# Patient Record
Sex: MALE | Race: WHITE | NOT HISPANIC OR LATINO | Employment: UNEMPLOYED | URBAN - METROPOLITAN AREA
[De-identification: names, ages, dates, MRNs, and addresses within clinical notes are randomized per-mention and may not be internally consistent; named-entity substitution may affect disease eponyms.]

---

## 2017-06-02 ENCOUNTER — TELEPHONE (OUTPATIENT)
Dept: SPORTS MEDICINE | Facility: CLINIC | Age: 62
End: 2017-06-02

## 2017-06-02 ENCOUNTER — TELEPHONE (OUTPATIENT)
Dept: FAMILY MEDICINE | Facility: CLINIC | Age: 62
End: 2017-06-02

## 2017-06-02 NOTE — TELEPHONE ENCOUNTER
Spoke c pt.     He asked for a handicap permit due to his R knee pain/meniscus tear.     Informed pt Dr. Gaxiola signs handicap parking permits on a very limited basis. Advised pt to see completion of form from PCP.     Offered appt c Dr. Gaxiola as pt has not been seen since 07/20/17. Pt deferred & will contact PCP.

## 2017-06-02 NOTE — TELEPHONE ENCOUNTER
----- Message from Christos Patel sent at 6/2/2017 12:21 PM CDT -----  Contact: self@home, [phone]  Pt called in asking for a call back regarding his knee injury. Please return call    Thank you

## 2017-06-05 NOTE — TELEPHONE ENCOUNTER
i have not seen him for any handicapping condition recently?. Last visit was in December 2016 for wellness exam. I see over a year ago he was seen for knee pain but would have to eval to see if this meets the criteria for handicapping condition that [facility] for.

## 2017-07-11 ENCOUNTER — TELEPHONE (OUTPATIENT)
Dept: FAMILY MEDICINE | Facility: CLINIC | Age: 62
End: 2017-07-11

## 2017-07-11 NOTE — TELEPHONE ENCOUNTER
----- Message from Samara Avelar sent at 7/11/2017  2:26 PM CDT -----  Contact: Self/ [phone]  Patient would like to speak with you about scheduling a sooner appointment than 9/15/17. Please advise.

## 2017-07-11 NOTE — TELEPHONE ENCOUNTER
Offered earlier appt with another Doctor in office.  Patient would prefer to wait to be seen by Dr Magallanes.

## 2017-08-22 DIAGNOSIS — Z12.11 COLON CANCER SCREENING: ICD-10-CM

## 2018-02-20 ENCOUNTER — TELEPHONE (OUTPATIENT)
Dept: FAMILY MEDICINE | Facility: CLINIC | Age: 63
End: 2018-02-20

## 2018-02-20 ENCOUNTER — OFFICE VISIT (OUTPATIENT)
Dept: FAMILY MEDICINE | Facility: CLINIC | Age: 63
End: 2018-02-20
Payer: COMMERCIAL

## 2018-02-20 VITALS
BODY MASS INDEX: 27 KG/M2 | TEMPERATURE: 99 F | HEART RATE: 82 BPM | SYSTOLIC BLOOD PRESSURE: 105 MMHG | DIASTOLIC BLOOD PRESSURE: 68 MMHG | WEIGHT: 199.31 LBS | OXYGEN SATURATION: 96 % | HEIGHT: 72 IN

## 2018-02-20 DIAGNOSIS — E78.5 HYPERLIPIDEMIA, UNSPECIFIED HYPERLIPIDEMIA TYPE: ICD-10-CM

## 2018-02-20 DIAGNOSIS — Z00.00 ROUTINE GENERAL MEDICAL EXAMINATION AT A HEALTH CARE FACILITY: Primary | ICD-10-CM

## 2018-02-20 DIAGNOSIS — Z11.59 ENCOUNTER FOR HEPATITIS C SCREENING TEST FOR LOW RISK PATIENT: ICD-10-CM

## 2018-02-20 PROCEDURE — 99999 PR PBB SHADOW E&M-EST. PATIENT-LVL III: CPT | Mod: PBBFAC,,, | Performed by: FAMILY MEDICINE

## 2018-02-20 PROCEDURE — 99396 PREV VISIT EST AGE 40-64: CPT | Mod: S$GLB,,, | Performed by: FAMILY MEDICINE

## 2018-02-20 RX ORDER — CLINDAMYCIN PHOSPHATE 11.9 MG/ML
SOLUTION TOPICAL 2 TIMES DAILY
Qty: 30 ML | Refills: 1 | Status: SHIPPED | OUTPATIENT
Start: 2018-02-20 | End: 2019-03-12

## 2018-02-20 RX ORDER — ATORVASTATIN CALCIUM 10 MG/1
10 TABLET, FILM COATED ORAL EVERY OTHER DAY
COMMUNITY
End: 2021-01-21 | Stop reason: SDUPTHER

## 2018-02-20 NOTE — TELEPHONE ENCOUNTER
----- Message from Alicia Wade sent at 2/20/2018  4:51 PM CST -----  Contact: [facility] [phone]  Pharmacy would like to speak with you about getting an authorization on a prescription clindamycin (CLEOCIN T) 1 % external solution. They only have quantity 60 available. Please call and advise.

## 2018-02-20 NOTE — PROGRESS NOTES
(Portions of this note were dictated using voice recognition software and may contain dictation related errors in spelling/grammar/syntax not found on text review)    CC:   Chief Complaint   Patient presents with    Annual Exam       HPI: 62 y.o. male last visit 2016    Hyperlipidemia: Was on Lipitor 20 mg daily (taking half a pill every other day.  Also takes garlic, fish oil, niacin OTC).      Elevated hemidiaphragm.  Was followed with pulmonology several years ago.  Was told that he needs implanted diaphragmatic pacemaker but this was not necessarily recommended.  Has not been doing these have a rehabilitation treatment.  Does get short of breath with walking distances especially with carrying objects.  No chest pain.  This is a chronic issue.  Was concerned about perhaps needing a handicap license to allow for closer parking secondary to this issue    Also had a right meniscal tear for which she was seen by orthopedics in 2016.  He gone to physical therapy temporarily but felt no improvement.  He is interested in perhaps seeing another orthopedist for opinion and for definitive management    He has had some folliculitis issues in the past and had an old prescription of clindamycin solution.  He would like to get a prescription of this renewed    Past Medical History:   Diagnosis Date    Elevated hemidiaphragm     HLD (hyperlipidemia)        Past Surgical History:   Procedure Laterality Date    NO PAST SURGERIES         Family History   Problem Relation Age of Onset    Skin cancer Father     Diabetes Neg Hx     Cancer Neg Hx     Coronary artery disease Neg Hx        Social History     Social History    Marital status: Single     Spouse name: N/A    Number of children: N/A    Years of education: N/A     Occupational History    Not on file.     Social History Main Topics    Smoking status: Never Smoker    Smokeless tobacco: Not on file    Alcohol use No    Drug use: Unknown    Sexual activity: Not  on file     Other Topics Concern    Not on file     Social History Narrative    No narrative on file                 HEALTH SCREENINGS  Immunizations:  tdap 2015  Flu: Declines     Age/Gender Appropriate screenings:  Colonoscopy is 7 years ago, normal.  Repeat in 10 years.  Prostate: PSA normal 11/16. Saw urologist, prostate exam nml.      Lab Results   Component Value Date    WBC 3.34 (L) 11/02/2016    HGB 15.5 11/02/2016    HCT 45.1 11/02/2016     11/02/2016    CHOL 225 (H) 11/02/2016    TRIG 116 11/02/2016    HDL 45 11/02/2016    ALT 27 11/02/2016    AST 23 11/02/2016     11/02/2016    K 4.2 11/02/2016     11/02/2016    CREATININE 1.2 11/02/2016    CALCIUM 9.0 11/02/2016    BUN 15 11/02/2016    CO2 23 11/02/2016    TSH 2.401 11/02/2016    PSA 1.9 11/02/2016    LDLCALC 156.8 11/02/2016     11/02/2016                 Vital signs reviewed  PE:   APPEARANCE: Well nourished, well developed, in no acute distress.    HEAD: Normocephalic, atraumatic.  EYES: PERRL. EOMI.   Conjunctivae noninjected.  EARS: TM's intact. Light reflex normal. No retraction or perforation.    NOSE: Mucosa pink. Airway clear.  MOUTH & THROAT: No tonsillar enlargement. No pharyngeal erythema or exudate.   NECK: Supple with no cervical lymphadenopathy.  No carotid bruits, no thyromegaly  CHEST: Good inspiratory effort. Lungs clear to auscultation with no wheezes or crackles.  CARDIOVASCULAR: Normal S1, S2. No rubs, murmurs, or gallops.  ABDOMEN: Bowel sounds normal. Not distended. Soft. No tenderness or masses. No organomegaly.  EXTREMITIES: No edema, cyanosis, or clubbing.  SKIN; folliculitis anterior chest    IMPRESSION  1. Routine general medical examination at a health care facility    2. Encounter for hepatitis C screening test for low risk patient    3. Hyperlipidemia, unspecified hyperlipidemia type        PLAN  Orders Placed This Encounter   Procedures    CBC auto differential    Comprehensive metabolic panel     Lipid panel    TSH    PSA, Screening    Hepatitis C antibody     Labs above including hepatitis C screening    Offered flu vaccine but patient declines    Continue Lipitor    Refill clindamycin solution    Given his respiratory issues, I have given him a temporary handicap sticker but have recommended referral to pulmonology again as he may need PFT repeated and also consideration for possible pulmonary rehabilitation if this might be an option to help with his chronic shortness of breath from elevated hemidiaphragm.  No imaging noted on file.  Did recommend that he get a chest x-ray to assess for this although he had mentioned that he is primarily here for his annual wellness exam and did not want to keep any other workup at this time.  He is about to change his insurance plan over the course of the next month and would like at that time to call back in to get the chest x-ray done along with a pulmonary referral    Right meniscal tear: Plans on seeing orthopedist for definitive management of this given fail prior therapy and progressive worsening of his knee symptoms on the right.  However, given some changes coming to his insurance coverage, he would like to wait until next month to call back to get the referral for orthopedic consultation    He has seen urology for recent prostate exam which was normal.  He requests a PSA to be done with his labs above which has been ordered        Answers for HPI/ROS submitted by the patient on 2/19/2018   activity change: No  unexpected weight change: No  neck pain: No  hearing loss: No  rhinorrhea: No  trouble swallowing: No  eye discharge: No  visual disturbance: No  chest tightness: No  wheezing: No  chest pain: No  palpitations: No  blood in stool: No  constipation: No  vomiting: No  diarrhea: No  polydipsia: No  polyuria: No  difficulty urinating: No  urgency: No  hematuria: No  joint swelling: No  arthralgias: No  headaches: No  weakness: No  confusion:  No  dysphoric mood: No

## 2018-02-20 NOTE — TELEPHONE ENCOUNTER
Pharmacy called.  They only have quantity 60 of clindamycin and is asking if it is ok to fill quatity 60?

## 2018-02-23 ENCOUNTER — TELEPHONE (OUTPATIENT)
Dept: FAMILY MEDICINE | Facility: CLINIC | Age: 63
End: 2018-02-23

## 2018-02-23 NOTE — TELEPHONE ENCOUNTER
----- Message from Erin Dia sent at 2/23/2018  1:04 PM CST -----  Contact: [phone]/self   Pt would like to speak with you concerning directions for his upcoming IFOBT test.  Pt states he will be going to take the test on Tuesday  Please call and advise

## 2018-02-23 NOTE — TELEPHONE ENCOUNTER
Advised patient that the Fitkit is a test he would take at home.  We are currently out in the office but did let patient know that I would contact him as soon as our order comes in so that he can come in to be educated on process.

## 2018-02-27 ENCOUNTER — LAB VISIT (OUTPATIENT)
Dept: LAB | Facility: HOSPITAL | Age: 63
End: 2018-02-27
Attending: FAMILY MEDICINE
Payer: COMMERCIAL

## 2018-02-27 DIAGNOSIS — Z00.00 ROUTINE GENERAL MEDICAL EXAMINATION AT A HEALTH CARE FACILITY: ICD-10-CM

## 2018-02-27 DIAGNOSIS — Z11.59 ENCOUNTER FOR HEPATITIS C SCREENING TEST FOR LOW RISK PATIENT: ICD-10-CM

## 2018-02-27 LAB
ALBUMIN SERPL BCP-MCNC: 3.7 G/DL
ALP SERPL-CCNC: 70 U/L
ALT SERPL W/O P-5'-P-CCNC: 18 U/L
ANION GAP SERPL CALC-SCNC: 7 MMOL/L
AST SERPL-CCNC: 19 U/L
BASOPHILS # BLD AUTO: 0.02 K/UL
BASOPHILS NFR BLD: 0.5 %
BILIRUB SERPL-MCNC: 0.4 MG/DL
BUN SERPL-MCNC: 18 MG/DL
CALCIUM SERPL-MCNC: 9.3 MG/DL
CHLORIDE SERPL-SCNC: 107 MMOL/L
CHOLEST SERPL-MCNC: 192 MG/DL
CHOLEST/HDLC SERPL: 4.1 {RATIO}
CO2 SERPL-SCNC: 26 MMOL/L
COMPLEXED PSA SERPL-MCNC: 2.7 NG/ML
CREAT SERPL-MCNC: 1.3 MG/DL
DIFFERENTIAL METHOD: NORMAL
EOSINOPHIL # BLD AUTO: 0.3 K/UL
EOSINOPHIL NFR BLD: 6 %
ERYTHROCYTE [DISTWIDTH] IN BLOOD BY AUTOMATED COUNT: 13.4 %
EST. GFR  (AFRICAN AMERICAN): >60 ML/MIN/1.73 M^2
EST. GFR  (NON AFRICAN AMERICAN): 58 ML/MIN/1.73 M^2
GLUCOSE SERPL-MCNC: 101 MG/DL
HCT VFR BLD AUTO: 43.9 %
HDLC SERPL-MCNC: 47 MG/DL
HDLC SERPL: 24.5 %
HGB BLD-MCNC: 14.7 G/DL
LDLC SERPL CALC-MCNC: 132.6 MG/DL
LYMPHOCYTES # BLD AUTO: 1.3 K/UL
LYMPHOCYTES NFR BLD: 32.1 %
MCH RBC QN AUTO: 30.2 PG
MCHC RBC AUTO-ENTMCNC: 33.5 G/DL
MCV RBC AUTO: 90 FL
MONOCYTES # BLD AUTO: 0.3 K/UL
MONOCYTES NFR BLD: 7 %
NEUTROPHILS # BLD AUTO: 2.3 K/UL
NEUTROPHILS NFR BLD: 54.4 %
NONHDLC SERPL-MCNC: 145 MG/DL
PLATELET # BLD AUTO: 180 K/UL
PMV BLD AUTO: 11 FL
POTASSIUM SERPL-SCNC: 4.2 MMOL/L
PROT SERPL-MCNC: 6.8 G/DL
RBC # BLD AUTO: 4.86 M/UL
SODIUM SERPL-SCNC: 140 MMOL/L
TRIGL SERPL-MCNC: 62 MG/DL
TSH SERPL DL<=0.005 MIU/L-ACNC: 1.97 UIU/ML
WBC # BLD AUTO: 4.14 K/UL

## 2018-02-27 PROCEDURE — 80053 COMPREHEN METABOLIC PANEL: CPT

## 2018-02-27 PROCEDURE — 86803 HEPATITIS C AB TEST: CPT

## 2018-02-27 PROCEDURE — 84153 ASSAY OF PSA TOTAL: CPT

## 2018-02-27 PROCEDURE — 36415 COLL VENOUS BLD VENIPUNCTURE: CPT

## 2018-02-27 PROCEDURE — 84443 ASSAY THYROID STIM HORMONE: CPT

## 2018-02-27 PROCEDURE — 85025 COMPLETE CBC W/AUTO DIFF WBC: CPT

## 2018-02-27 PROCEDURE — 80061 LIPID PANEL: CPT

## 2018-02-28 ENCOUNTER — TELEPHONE (OUTPATIENT)
Dept: FAMILY MEDICINE | Facility: CLINIC | Age: 63
End: 2018-02-28

## 2018-02-28 LAB — HCV AB SERPL QL IA: NEGATIVE

## 2018-03-04 ENCOUNTER — PATIENT MESSAGE (OUTPATIENT)
Dept: FAMILY MEDICINE | Facility: CLINIC | Age: 63
End: 2018-03-04

## 2018-03-05 ENCOUNTER — PATIENT MESSAGE (OUTPATIENT)
Dept: FAMILY MEDICINE | Facility: CLINIC | Age: 63
End: 2018-03-05

## 2018-03-06 ENCOUNTER — TELEPHONE (OUTPATIENT)
Dept: FAMILY MEDICINE | Facility: CLINIC | Age: 63
End: 2018-03-06

## 2018-03-12 ENCOUNTER — PATIENT MESSAGE (OUTPATIENT)
Dept: FAMILY MEDICINE | Facility: CLINIC | Age: 63
End: 2018-03-12

## 2018-03-12 ENCOUNTER — TELEPHONE (OUTPATIENT)
Dept: FAMILY MEDICINE | Facility: CLINIC | Age: 63
End: 2018-03-12

## 2018-03-12 DIAGNOSIS — R06.09 DOE (DYSPNEA ON EXERTION): Primary | ICD-10-CM

## 2018-03-12 DIAGNOSIS — J98.6 ELEVATED HEMIDIAPHRAGM: ICD-10-CM

## 2018-03-12 NOTE — TELEPHONE ENCOUNTER
Orders Placed This Encounter   Procedures    Ambulatory referral to Pulmonology     Encounter Diagnoses   Name Primary?    TREADWELL (dyspnea on exertion) Yes    Elevated hemidiaphragm

## 2018-03-13 ENCOUNTER — TELEPHONE (OUTPATIENT)
Dept: FAMILY MEDICINE | Facility: CLINIC | Age: 63
End: 2018-03-13

## 2018-03-13 NOTE — TELEPHONE ENCOUNTER
Explained to patient Dr. Magallanes's recommendations about his referral to Pulmonology. Patient verbalized understanding.

## 2018-03-13 NOTE — TELEPHONE ENCOUNTER
----- Message from Vandana Burnham sent at 3/13/2018 12:06 PM CDT -----  Patient wants to know why he's being referred to Pulmonology

## 2018-03-13 NOTE — TELEPHONE ENCOUNTER
Called patient in regards to email that was sent. Called to explain to patient that we haven't received the records back here. I advised him that it could have been sent to medical records for scanning and it takes a few weeks for processing. Patient was very upset. He stated I was condescending and did not give him a chance to speak (which I did) He stated he doesn't understand why he has to be included in the process of knowing when he dropped the papers off, who he dropped them to, and what time. I informed him that it was to better assist him in finding out what happened. He stated that he did not want to speak to me but to Dr. Magallanes directly.

## 2018-03-21 ENCOUNTER — CLINICAL SUPPORT (OUTPATIENT)
Dept: FAMILY MEDICINE | Facility: CLINIC | Age: 63
End: 2018-03-21
Payer: COMMERCIAL

## 2018-04-06 ENCOUNTER — LAB VISIT (OUTPATIENT)
Dept: LAB | Facility: HOSPITAL | Age: 63
End: 2018-04-06
Attending: FAMILY MEDICINE
Payer: COMMERCIAL

## 2018-04-06 DIAGNOSIS — Z12.11 COLON CANCER SCREENING: ICD-10-CM

## 2018-04-06 LAB — HEMOCCULT STL QL IA: NEGATIVE

## 2018-04-06 PROCEDURE — 82274 ASSAY TEST FOR BLOOD FECAL: CPT

## 2018-04-11 ENCOUNTER — OFFICE VISIT (OUTPATIENT)
Dept: PULMONOLOGY | Facility: CLINIC | Age: 63
End: 2018-04-11
Payer: COMMERCIAL

## 2018-04-11 ENCOUNTER — HOSPITAL ENCOUNTER (OUTPATIENT)
Dept: RADIOLOGY | Facility: HOSPITAL | Age: 63
Discharge: HOME | End: 2018-04-11
Attending: INTERNAL MEDICINE
Payer: COMMERCIAL

## 2018-04-11 VITALS
WEIGHT: 198 LBS | OXYGEN SATURATION: 97 % | HEART RATE: 73 BPM | HEIGHT: 72 IN | BODY MASS INDEX: 26.82 KG/M2 | SYSTOLIC BLOOD PRESSURE: 116 MMHG | DIASTOLIC BLOOD PRESSURE: 74 MMHG

## 2018-04-11 DIAGNOSIS — Q79.1 DIAPHRAGMATIC EVENTRATION: ICD-10-CM

## 2018-04-11 DIAGNOSIS — G47.33 OSA (OBSTRUCTIVE SLEEP APNEA): Primary | ICD-10-CM

## 2018-04-11 DIAGNOSIS — R09.81 NASAL CONGESTION: ICD-10-CM

## 2018-04-11 DIAGNOSIS — R06.09 DYSPNEA ON EXERTION: ICD-10-CM

## 2018-04-11 PROCEDURE — 71046 X-RAY EXAM CHEST 2 VIEWS: CPT | Mod: TC

## 2018-04-11 PROCEDURE — 99999 PR PBB SHADOW E&M-EST. PATIENT-LVL III: CPT | Mod: PBBFAC,,, | Performed by: INTERNAL MEDICINE

## 2018-04-11 PROCEDURE — 99244 OFF/OP CNSLTJ NEW/EST MOD 40: CPT | Mod: S$GLB,,, | Performed by: INTERNAL MEDICINE

## 2018-04-11 PROCEDURE — 71046 X-RAY EXAM CHEST 2 VIEWS: CPT | Mod: 26,,, | Performed by: RADIOLOGY

## 2018-04-11 NOTE — PROGRESS NOTES
Melo Zuñiga  was seen as a new patient at the request  Johnie Magallanes MD for the evaluation of  dyspnea.    CHIEF COMPLAINT:  Shortness of Breath      HISTORY OF PRESENT ILLNESS: Melo Zuñiga is a 62 y.o. male  has a past medical history of Elevated hemidiaphragm; HLD (hyperlipidemia); and Meniscal injury.  Patient was diagnosed with diaphgram paresis by Dr. Chu.  Patient endorsed difficulty with deep inspiration.  Patient denied orthopnea.  +difficulty with bending over tying shoelace.  No chest pain.  No fever/chills.  No pnd.  Exertion is limited by meniscal injury 3 years ago.  5 lbs weight gain over past 3 years.  No coughing or wheezing.  No hemoptysis.      PAST MEDICAL HISTORY:    Active Ambulatory Problems     Diagnosis Date Noted    HLD (hyperlipidemia)     Elevated hemidiaphragm     Dysphonia 07/19/2016    Vocal cord paralysis 07/19/2016     Resolved Ambulatory Problems     Diagnosis Date Noted    No Resolved Ambulatory Problems     Past Medical History:   Diagnosis Date    Elevated hemidiaphragm     HLD (hyperlipidemia)     Meniscal injury                 PAST SURGICAL HISTORY:    Past Surgical History:   Procedure Laterality Date    COLONOSCOPY      NO PAST SURGERIES           FAMILY HISTORY:                Family History   Problem Relation Age of Onset    Skin cancer Father     Diabetes Neg Hx     Cancer Neg Hx     Coronary artery disease Neg Hx        SOCIAL HISTORY:          Tobacco:   History   Smoking Status    Never Smoker   Smokeless Tobacco    Never Used     alcohol use:    History   Alcohol Use No               Occupation:  sales    ALLERGIES:  Review of patient's allergies indicates:  No Known Allergies    CURRENT MEDICATIONS:    Current Outpatient Prescriptions   Medication Sig Dispense Refill    ammonium lactate 12 % Crea apply to FEET daily as directed  0    atorvastatin (LIPITOR) 10 MG tablet Take 10 mg by mouth every other day.      clindamycin (CLEOCIN T) 1  % external solution Apply topically 2 (two) times daily. 30 mL 1     No current facility-administered medications for this visit.                   REVIEW OF SYSTEMS:     Pulmonary related symptoms as per HPI.  Gen:  no weight loss, no fever, no night sweat  HEENT:  no visual changes, no sore throat, no hearing loss  CV:  Per hpi  GI:  no melena, no hematochezia, no diarhea, no constipation.  :  no dysuria, no hematuria, no hesistancy, no dribbling  Neuro:  no syncope, no vertigo, no tinitus  Psych:  No homocide or suicide ideation; no depression.  Endocrine:  No heat or cold intolerance.  Sleep:  + snoring; no witnessed apnea.  Tire upon awake on most days.  No daytime sleepiness.  Otherwise, a balance of systems reviewed is negative.          PHYSICAL EXAM:  Vitals:    04/11/18 1513   BP: 116/74   Pulse: 73   SpO2: 97%   Weight: 89.8 kg (197 lb 15.6 oz)   Height: 6' (1.829 m)   PainSc: 0-No pain     Body mass index is 26.85 kg/m².     GENERAL:  well develop; no apparent distress  HEENT:  no nasal congestion; no discharge noted; class 4 modified mallampatti.   NECK:  supple; no palpable masses.  CARDIO: regular rate and rhythm  PULM:  clear to auscultation bilaterally; no intercostals retractions; no accessory muscle usage   ABDOMEN:  soft nontender/nondistended.  +bowel sound  EXTREMITIES no cce  NEURO:  CN II-XII intact.  5/5 motor in all extremities.  sensation grossly intact   to light touch.  PSYCH:  normal affect.  Alert and oriented x 4    LABS  Pulmonary Functions Testing Results: none  ABG none  CXR:  8/29/14 @[facility] (only dictation) elevation of left hemidiaphragm;  Sniff test 12/5/14 no paradoxical motion; paresis of left hemidiaphragm  CT CHEST:  none    ASSESSMENT    ICD-10-CM ICD-9-CM    1. JILLIAN (obstructive sleep apnea) G47.33 327.23 Polysomnogram (CPAP will be added if patient meets diagnostic criteria.)   2. 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 Dyspnea on exertion R06.09 786.09 Stress test, pulmonary   4. Nasal congestion R09.81 478.19        PLAN:    beata - snoring and restless sleep warrants sleep study.  Diaphragmatic dysfunction warrants in lab evaluation    Nasal congestion - sinus irrigation and nasal steroid.      diaphgram paresis - cxr, pft supine and erect, 6 min walk.  May consider sniff testing.      Dyspnea - with exertion.  deconditition +/- diaphragm dysfunction.  Will monitor.      Patient will Follow-up after sleep study. with md.    CC: Send copy of this note to Johnie Magallanes MD

## 2018-04-11 NOTE — LETTER
April 11, 2018      Johnie Magallanes MD  [address]  [address]  [facility] [address]           [facility]  [address]  [place] [address]  Phone: [phone]          Patient: Melo Zuñiga   MR Number: [id number]   YOB: 1955   Date of Visit: 4/11/2018       Dear Dr. Johnie Magallanes:    Thank you for referring Meol Zuñiga to me for evaluation. Attached you will find relevant portions of my assessment and plan of care.    If you have questions, please do not hesitate to call me. I look forward to following Melo Zuñiga along with you.    Sincerely,    Bernard Temple MD    Enclosure  CC:  No Recipients    If you would like to receive this communication electronically, please contact [email or web address] or [phone] to request more information on Shut Down Link access.    For providers and/or their staff who would like to refer a patient to [facility], please contact us through our one-stop-shop provider referral line, [facility], at [phone].    If you feel you have received this communication in error or would no longer like to receive these types of communications, please e-mail [email or web address]

## 2018-04-11 NOTE — PATIENT INSTRUCTIONS
1.  NeilMed Sinus Rinse Regular Kit    Recipe 1/4 teaspoon of salt and 1/4 teaspoon of baking soda in distilled water.  Be sure to tilt head forward as shown in picture.            flonase or nasonex over the counter.  2 sprays per nostril daily. Be sure to tilt head forward when dispensing medication.      Annamaria or Mono will contact you to schedule your sleep study. Their number is [phone] (ext 2). The [facility] is located on 7th floor of the [facility].    We will call you when the sleep study results are ready - if you have not heard from us by 2 weeks from the date of the study, please call [phone] (ext 1).    You are advised to abstain from driving should you feel sleepy or drowsy.

## 2018-04-12 ENCOUNTER — TELEPHONE (OUTPATIENT)
Dept: SLEEP MEDICINE | Facility: OTHER | Age: 63
End: 2018-04-12

## 2018-04-12 DIAGNOSIS — J98.6 DIAPHRAGM PARALYSIS: Primary | ICD-10-CM

## 2018-04-18 ENCOUNTER — HOSPITAL ENCOUNTER (OUTPATIENT)
Dept: RADIOLOGY | Facility: HOSPITAL | Age: 63
Discharge: HOME | End: 2018-04-18
Attending: INTERNAL MEDICINE
Payer: COMMERCIAL

## 2018-04-18 ENCOUNTER — HOSPITAL ENCOUNTER (OUTPATIENT)
Dept: PULMONOLOGY | Facility: CLINIC | Age: 63
Discharge: HOME | End: 2018-04-18
Payer: COMMERCIAL

## 2018-04-18 VITALS — HEIGHT: 69 IN | WEIGHT: 196.19 LBS | BODY MASS INDEX: 29.06 KG/M2

## 2018-04-18 DIAGNOSIS — J38.00 VOCAL CORD PARALYSIS: Primary | ICD-10-CM

## 2018-04-18 DIAGNOSIS — Q79.1 DIAPHRAGMATIC EVENTRATION: ICD-10-CM

## 2018-04-18 DIAGNOSIS — J98.6 DIAPHRAGM PARALYSIS: ICD-10-CM

## 2018-04-18 DIAGNOSIS — R06.09 DYSPNEA ON EXERTION: ICD-10-CM

## 2018-04-18 LAB
PRE FEV1 FVC: 74
PRE FEV1 FVC: 76
PRE FEV1: 2.35
PRE FEV1: 2.56
PRE FVC: 3.17
PRE FVC: 3.36
PREDICTED FEV1 FVC: 80
PREDICTED FEV1 FVC: 80
PREDICTED FEV1: 3.28
PREDICTED FEV1: 3.28
PREDICTED FVC: 4.09
PREDICTED FVC: 4.09

## 2018-04-18 PROCEDURE — 76000 FLUOROSCOPY <1 HR PHYS/QHP: CPT | Mod: TC

## 2018-04-18 PROCEDURE — 94727 GAS DIL/WSHOT DETER LNG VOL: CPT | Mod: S$GLB,,, | Performed by: INTERNAL MEDICINE

## 2018-04-18 PROCEDURE — 94010 BREATHING CAPACITY TEST: CPT | Mod: S$GLB,,, | Performed by: INTERNAL MEDICINE

## 2018-04-18 PROCEDURE — 94618 PULMONARY STRESS TESTING: CPT | Mod: S$GLB,,, | Performed by: INTERNAL MEDICINE

## 2018-04-18 PROCEDURE — 76000 FLUOROSCOPY <1 HR PHYS/QHP: CPT | Mod: 26,,, | Performed by: RADIOLOGY

## 2018-04-18 PROCEDURE — 94729 DIFFUSING CAPACITY: CPT | Mod: S$GLB,,, | Performed by: INTERNAL MEDICINE

## 2018-04-18 NOTE — PROCEDURES
Melo Zuñiga is a 62 y.o.  male patient, who presents for a 6 minute walk test ordered by Bernard Temple MD.  The diagnosis is Shortness of Breath.  The patient's BMI is 29 kg/m2.  Predicted distance (lower limit of normal) is 380.15 meters.      Test Results:    The test was completed without stopping.  The total time walked was 360 seconds.  During walking, the patient reported:  No complaints.  The patient used no assistive devices during testing.     04/18/2018---------Distance: 487.68 meters (1600 feet)     O2 Sat % Supplemental Oxygen Heart Rate Blood Pressure Linwood Scale   Pre-exercise  (Resting) 98 % Room Air 63 bpm 130/81 mmHg 3   During Exercise 98 % Room Air 77 bpm 124/70 mmHg 3   Post-exercise  (Recovery) 98 % Room Air  68 bpm       Recovery Time:  82 seconds    Performing nurse/tech:  JORDY Joyce      PREVIOUS STUDY:   The patient has not had a previous study.      CLINICAL INTERPRETATION:  Six minute walk distance is 487.68 meters (1600 feet) with moderate dyspnea.  During exercise, there was no desaturation while breathing room air.  Both blood pressure and heart rate remained stable with walking.  The patient did not report non-pulmonary symptoms during exercise.  No previous study performed.  Based upon age and body mass index, exercise capacity is normal.

## 2018-04-20 ENCOUNTER — TELEPHONE (OUTPATIENT)
Dept: SLEEP MEDICINE | Facility: CLINIC | Age: 63
End: 2018-04-20

## 2018-04-20 NOTE — TELEPHONE ENCOUNTER
Result of pft, sniff and 6 min walk d/w patient.  Aware of diagnosis of diaphragm paralysis.  In light of normal pft and 6 min walk, I do not recommend surgical intervention.  At this time, recommend weight loss with exercise after knee arthroscopy.  Please schedule 1 year follow recall.

## 2018-04-23 ENCOUNTER — TELEPHONE (OUTPATIENT)
Dept: SLEEP MEDICINE | Facility: CLINIC | Age: 63
End: 2018-04-23

## 2018-05-08 ENCOUNTER — TELEPHONE (OUTPATIENT)
Dept: SLEEP MEDICINE | Facility: OTHER | Age: 63
End: 2018-05-08

## 2018-05-09 ENCOUNTER — HOSPITAL ENCOUNTER (OUTPATIENT)
Dept: SLEEP MEDICINE | Facility: OTHER | Age: 63
Discharge: HOME | End: 2018-05-09
Attending: INTERNAL MEDICINE
Payer: COMMERCIAL

## 2018-05-09 DIAGNOSIS — G47.33 OSA (OBSTRUCTIVE SLEEP APNEA): ICD-10-CM

## 2018-05-09 PROCEDURE — 95811 POLYSOM 6/>YRS CPAP 4/> PARM: CPT | Mod: 26,,, | Performed by: INTERNAL MEDICINE

## 2018-05-09 PROCEDURE — 95811 POLYSOM 6/>YRS CPAP 4/> PARM: CPT

## 2018-05-10 NOTE — PROGRESS NOTES
This is a Screen Study for 62 year old Melo Zuñiga.   The procedure was explained to the patient upon arrival. This included the set - up process and what to expect during the night.   It was also explained to the patient that the test will be interpreted by a physician and the results will be sent to his PCP.  His EKg appeared to be NSR with Occational PVCs.  soft - moderate snoring observed.  Sleep disorder breathing most significant in REM sleep.  He met criteria in time for a split night study.   Started CPAP with a Med. Eson nasal mask and chinstrap @ 4cm  Occational small mouth leaks may benefit from a full face mask.   Explored pressures 4 - 11  Supine REM @ 10 cm   Pt reaction to CPAP: Did not like it   A thank you letter was given to the patient upon leaving the sleep lab.

## 2018-05-16 ENCOUNTER — TELEPHONE (OUTPATIENT)
Dept: PULMONOLOGY | Facility: HOSPITAL | Age: 63
End: 2018-05-16

## 2018-05-16 DIAGNOSIS — G47.33 OSA (OBSTRUCTIVE SLEEP APNEA): Primary | ICD-10-CM

## 2018-05-17 NOTE — TELEPHONE ENCOUNTER
Please let patient know that he/she has sleep apnea and the titration went well.  Patient can have the option of coming into clinic to further discuss result of sleep study  or begin cpap and follow up in clinic.  If patient agreed to cpap, then I would like to set patient up with cpap via dme of choice.  Clinic follow up with md in approximately 8 weeks after cpap usage.

## 2019-01-23 ENCOUNTER — PATIENT MESSAGE (OUTPATIENT)
Dept: DERMATOLOGY | Facility: CLINIC | Age: 64
End: 2019-01-23

## 2019-01-28 ENCOUNTER — PATIENT MESSAGE (OUTPATIENT)
Dept: DERMATOLOGY | Facility: CLINIC | Age: 64
End: 2019-01-28

## 2019-01-30 ENCOUNTER — OFFICE VISIT (OUTPATIENT)
Dept: DERMATOLOGY | Facility: CLINIC | Age: 64
End: 2019-01-30
Payer: MEDICARE

## 2019-01-30 DIAGNOSIS — L30.8 ASTEATOTIC ECZEMA: Primary | ICD-10-CM

## 2019-01-30 DIAGNOSIS — Q82.8 KERATODERMA: ICD-10-CM

## 2019-01-30 PROCEDURE — 99999 PR PBB SHADOW E&M-EST. PATIENT-LVL II: ICD-10-PCS | Mod: PBBFAC,,, | Performed by: DERMATOLOGY

## 2019-01-30 PROCEDURE — 99213 OFFICE O/P EST LOW 20 MIN: CPT | Mod: S$PBB,,, | Performed by: DERMATOLOGY

## 2019-01-30 PROCEDURE — 99999 PR PBB SHADOW E&M-EST. PATIENT-LVL II: CPT | Mod: PBBFAC,,, | Performed by: DERMATOLOGY

## 2019-01-30 PROCEDURE — 99212 OFFICE O/P EST SF 10 MIN: CPT | Mod: PBBFAC,PO | Performed by: DERMATOLOGY

## 2019-01-30 PROCEDURE — 99213 PR OFFICE/OUTPT VISIT, EST, LEVL III, 20-29 MIN: ICD-10-PCS | Mod: S$PBB,,, | Performed by: DERMATOLOGY

## 2019-01-30 RX ORDER — AMMONIUM LACTATE 12 G/100G
CREAM TOPICAL
Qty: 140 G | Refills: 6 | Status: SHIPPED | OUTPATIENT
Start: 2019-01-30 | End: 2019-07-02

## 2019-01-30 RX ORDER — TRIAMCINOLONE ACETONIDE 1 MG/G
CREAM TOPICAL
Qty: 454 G | Refills: 1 | Status: SHIPPED | OUTPATIENT
Start: 2019-01-30 | End: 2019-03-12

## 2019-01-30 NOTE — PROGRESS NOTES
Subjective:       Patient ID:  Melo Zuñiga is a 63 y.o. male who presents for   Chief Complaint   Patient presents with    Itching    Rash     hands     Pt presnets for new onset severe  Itching lower legs x 1 month. Constant.  tx with decreasing showers, antiseptic such as listerine, OTC lotion.  All helps temporarily.  Also has new rash type issue on dorsal hands x 1 week.       Itching     Rash         Review of Systems   Constitutional: Negative for fever, chills, weight loss, weight gain, fatigue and malaise.   Skin: Positive for itching, rash and dry skin.   Hematologic/Lymphatic: Negative for adenopathy. Does not bruise/bleed easily.        Objective:    Physical Exam   Constitutional: He appears well-developed and well-nourished. No distress.   Neurological: He is alert and oriented to person, place, and time. He is not disoriented.   Psychiatric: He has a normal mood and affect.   Skin:   Areas Examined (abnormalities noted in diagram):   RUE Inspected  LUE Inspection Performed  RLE Inspected  LLE Inspection Performed  Nails and Digits Inspection Performed                  Diagram Legend     Erythematous scaling macule/papule c/w actinic keratosis       Vascular papule c/w angioma      Pigmented verrucoid papule/plaque c/w seborrheic keratosis      Yellow umbilicated papule c/w sebaceous hyperplasia      Irregularly shaped tan macule c/w lentigo     1-2 mm smooth white papules consistent with Milia      Movable subcutaneous cyst with punctum c/w epidermal inclusion cyst      Subcutaneous movable cyst c/w pilar cyst      Firm pink to brown papule c/w dermatofibroma      Pedunculated fleshy papule(s) c/w skin tag(s)      Evenly pigmented macule c/w junctional nevus     Mildly variegated pigmented, slightly irregular-bordered macule c/w mildly atypical nevus      Flesh colored to evenly pigmented papule c/w intradermal nevus       Pink pearly papule/plaque c/w basal cell carcinoma      Erythematous  hyperkeratotic cursted plaque c/w SCC      Surgical scar with no sign of skin cancer recurrence      Open and closed comedones      Inflammatory papules and pustules      Verrucoid papule consistent consistent with wart     Erythematous eczematous patches and plaques     Dystrophic onycholytic nail with subungual debris c/w onychomycosis     Umbilicated papule    Erythematous-base heme-crusted tan verrucoid plaque consistent with inflamed seborrheic keratosis     Erythematous Silvery Scaling Plaque c/w Psoriasis     See annotation      Assessment / Plan:        Asteatotic eczema  Discussed with patient good skin care regimen including avoiding fragranced products and very hot showers.  Recommended dove sensitive skin bar soap or cerave hydrating cleanser or bar.  Recommend Cerave cream  For moisturization daily -2x daily.     -     triamcinolone acetonide 0.1% (KENALOG) 0.1 % cream; AAA bid to legs prn itching  Dispense: 454 g; Refill: 1    Keratoderma  -     ammonium lactate 12 % Crea; aaa  On bottom of feet as needed  Dispense: 140 g; Refill: 6             Follow-up if symptoms worsen or fail to improve.

## 2019-01-31 ENCOUNTER — TELEPHONE (OUTPATIENT)
Dept: FAMILY MEDICINE | Facility: CLINIC | Age: 64
End: 2019-01-31

## 2019-01-31 NOTE — TELEPHONE ENCOUNTER
----- Message from Mala Issa sent at 1/31/2019  4:10 PM CST -----  Contact: Self/ [phone]  Patient asked to speak with you about his handicap plaque.    Please call.

## 2019-01-31 NOTE — TELEPHONE ENCOUNTER
Patient advised that Dr Magallanes is off today and will be able to address request then.  Patient verbalized understanding.

## 2019-02-18 ENCOUNTER — PATIENT MESSAGE (OUTPATIENT)
Dept: FAMILY MEDICINE | Facility: CLINIC | Age: 64
End: 2019-02-18

## 2019-02-26 ENCOUNTER — PATIENT MESSAGE (OUTPATIENT)
Dept: DERMATOLOGY | Facility: CLINIC | Age: 64
End: 2019-02-26

## 2019-03-12 ENCOUNTER — OFFICE VISIT (OUTPATIENT)
Dept: PODIATRY | Facility: CLINIC | Age: 64
End: 2019-03-12
Payer: MEDICARE

## 2019-03-12 VITALS — BODY MASS INDEX: 29.99 KG/M2 | WEIGHT: 202.5 LBS | HEIGHT: 69 IN

## 2019-03-12 DIAGNOSIS — M20.41 HAMMER TOES OF BOTH FEET: ICD-10-CM

## 2019-03-12 DIAGNOSIS — M21.6X1 ACQUIRED EQUINUS DEFORMITY OF BOTH FEET: ICD-10-CM

## 2019-03-12 DIAGNOSIS — M21.6X2 ACQUIRED EQUINUS DEFORMITY OF BOTH FEET: ICD-10-CM

## 2019-03-12 DIAGNOSIS — M20.42 HAMMER TOES OF BOTH FEET: ICD-10-CM

## 2019-03-12 DIAGNOSIS — M21.41 ACQUIRED PES PLANOVALGUS OF RIGHT FOOT: ICD-10-CM

## 2019-03-12 DIAGNOSIS — M21.42 ACQUIRED PES PLANOVALGUS OF LEFT FOOT: Primary | ICD-10-CM

## 2019-03-12 PROCEDURE — 99999 PR PBB SHADOW E&M-EST. PATIENT-LVL III: CPT | Mod: PBBFAC,,, | Performed by: PODIATRIST

## 2019-03-12 PROCEDURE — 99203 PR OFFICE/OUTPT VISIT, NEW, LEVL III, 30-44 MIN: ICD-10-PCS | Mod: S$PBB,,, | Performed by: PODIATRIST

## 2019-03-12 PROCEDURE — 99213 OFFICE O/P EST LOW 20 MIN: CPT | Mod: PBBFAC,PN | Performed by: PODIATRIST

## 2019-03-12 PROCEDURE — 99999 PR PBB SHADOW E&M-EST. PATIENT-LVL III: ICD-10-PCS | Mod: PBBFAC,,, | Performed by: PODIATRIST

## 2019-03-12 PROCEDURE — 99203 OFFICE O/P NEW LOW 30 MIN: CPT | Mod: S$PBB,,, | Performed by: PODIATRIST

## 2019-03-24 NOTE — PROGRESS NOTES
Subjective:      Patient ID: Melo Zuñiga is a 63 y.o. male.    Chief Complaint: Foot Pain (keya -insteps &  right foot 2nd toe) and Other Misc (PCP:  Dr Magallanes 2/20/18)    Melo is a 63 y.o. male who presents to the podiatry clinic  with complaint of  bilateral foot pain. Onset of the symptoms was several years ago. Precipitating event: none known. Current symptoms include: ability to bear weight, but with some pain and worsening symptoms after a period of activity. Aggravating factors: any weight bearing. Symptoms have gradually worsened. Patient has had prior foot problems. Evaluation to date: none. Treatment to date: custom arch supports he got several years ago but are wearing out. THey helped at first. Patients rates pain 7/10 on pain scale.        Review of Systems   Constitution: Negative for chills and fever.   Cardiovascular: Negative for claudication and leg swelling.   Respiratory: Negative for shortness of breath.    Skin: Negative for itching, nail changes and rash.   Musculoskeletal: Positive for arthritis. Negative for muscle cramps, muscle weakness and myalgias.   Gastrointestinal: Negative for nausea and vomiting.   Neurological: Negative for focal weakness, loss of balance, numbness and paresthesias.           Objective:      Physical Exam   Constitutional: He is oriented to person, place, and time. He appears well-developed and well-nourished. No distress.   Cardiovascular:   Pulses:       Dorsalis pedis pulses are 2+ on the right side, and 2+ on the left side.        Posterior tibial pulses are 2+ on the right side, and 2+ on the left side.   < 3 sec capillary refill time to toes 1-5 bilateral. Toes and feet are warm to touch proximally with normal distal cooling b/l. There is some hair growth on the feet and toes b/l. There is no edema b/l. No spider veins or varicosities present b/l.      Musculoskeletal:   Bilateral medial arch collapse with weight bearing, eversion of 8 deg with  RCSP.    Reducible extensor and flexor contractures at the MTPJ and PIPJ of toes 2-5, bilat.     Equinus noted b/l ankles with < 10 deg DF noted. MMT 5/5 in DF/PF/Inv/Ev resistance with no reproduction of pain in any direction. Passive range of motion of ankle and pedal joints is painless b/l.     Neurological: He is alert and oriented to person, place, and time. He has normal strength. He displays no atrophy and no tremor. No sensory deficit. He exhibits normal muscle tone.   Negative tinel sign bilateral.   Skin: Skin is warm, dry and intact. No abrasion, no bruising, no burn, no ecchymosis, no laceration, no lesion, no petechiae and no rash noted. He is not diaphoretic. No cyanosis or erythema. No pallor. Nails show no clubbing.   Skin temperature, texture and turgor within normal limits.   Psychiatric: He has a normal mood and affect. His behavior is normal.             Assessment:       Encounter Diagnoses   Name Primary?    Acquired pes planovalgus of left foot Yes    Acquired pes planovalgus of right foot     Acquired equinus deformity of both feet     Hammer toes of both feet          Plan:       Melo was seen today for foot pain and other misc.    Diagnoses and all orders for this visit:    Acquired pes planovalgus of left foot  -     ORTHOTIC DEVICE (DME)    Acquired pes planovalgus of right foot  -     ORTHOTIC DEVICE (DME)    Acquired equinus deformity of both feet  -     ORTHOTIC DEVICE (DME)    Hammer toes of both feet  -     ORTHOTIC DEVICE (DME)      I counseled the patient on his conditions, their implications and medical management.    Will get new prescription orthotics, I gave a prescription and a list of places to get it filled    Patient will stretch the tendo achilles complex three times daily as demonstrated in the office.  Literature was dispensed illustrating proper stretching technique.    Discussed importance of wearing shoes with adequate room in toe box to accommodate his toe  deformities. Also advised to have a thumb width from end of big toe to end of toe box in his shoes    Return PHOEBE Leiva DPM

## 2019-05-05 ENCOUNTER — PATIENT MESSAGE (OUTPATIENT)
Dept: FAMILY MEDICINE | Facility: CLINIC | Age: 64
End: 2019-05-05

## 2019-05-06 ENCOUNTER — PATIENT MESSAGE (OUTPATIENT)
Dept: FAMILY MEDICINE | Facility: CLINIC | Age: 64
End: 2019-05-06

## 2019-05-06 RX ORDER — HYDROXYZINE HYDROCHLORIDE 25 MG/1
TABLET, FILM COATED ORAL
Qty: 60 TABLET | Refills: 3 | Status: SHIPPED | OUTPATIENT
Start: 2019-05-06 | End: 2019-07-02

## 2019-05-06 NOTE — TELEPHONE ENCOUNTER
Sure, I can refill this for you.  I have written for 2 a night although it you could even try taking 3 a night for couple nights if you  needed to  Johnie Magallanes MD    ===View-only below this line===      ----- Message -----     From: Melo Zuñiga     Sent: 5/5/2019  6:51 PM CDT       To: Johnie Magallanes MD  Subject: Prescription    Hydroxyzine HCL (generic for atarax) 25 MG tablets. I was taking 2 at night and that was kind of ok. I would like to have it refilled or something a little stronger, at any [facility] in [place]. thanx melo [phone]

## 2019-06-18 ENCOUNTER — PATIENT OUTREACH (OUTPATIENT)
Dept: ADMINISTRATIVE | Facility: HOSPITAL | Age: 64
End: 2019-06-18

## 2019-06-18 ENCOUNTER — DOCUMENTATION ONLY (OUTPATIENT)
Dept: ADMINISTRATIVE | Facility: HOSPITAL | Age: 64
End: 2019-06-18

## 2019-06-18 DIAGNOSIS — Z12.9 CANCER SCREENING: Primary | ICD-10-CM

## 2019-06-28 ENCOUNTER — PATIENT MESSAGE (OUTPATIENT)
Dept: DERMATOLOGY | Facility: CLINIC | Age: 64
End: 2019-06-28

## 2019-07-02 ENCOUNTER — OFFICE VISIT (OUTPATIENT)
Dept: FAMILY MEDICINE | Facility: CLINIC | Age: 64
End: 2019-07-02
Payer: MEDICARE

## 2019-07-02 VITALS
WEIGHT: 196.19 LBS | HEART RATE: 67 BPM | OXYGEN SATURATION: 98 % | BODY MASS INDEX: 26.57 KG/M2 | HEIGHT: 72 IN | TEMPERATURE: 98 F | DIASTOLIC BLOOD PRESSURE: 74 MMHG | SYSTOLIC BLOOD PRESSURE: 130 MMHG

## 2019-07-02 DIAGNOSIS — R07.9 CHEST PAIN, UNSPECIFIED TYPE: Primary | ICD-10-CM

## 2019-07-02 DIAGNOSIS — F33.1 MODERATE EPISODE OF RECURRENT MAJOR DEPRESSIVE DISORDER: ICD-10-CM

## 2019-07-02 DIAGNOSIS — G47.00 INSOMNIA, UNSPECIFIED TYPE: ICD-10-CM

## 2019-07-02 PROCEDURE — 93010 ELECTROCARDIOGRAM REPORT: CPT | Mod: S$PBB,,, | Performed by: INTERNAL MEDICINE

## 2019-07-02 PROCEDURE — 99999 PR PBB SHADOW E&M-EST. PATIENT-LVL III: CPT | Mod: PBBFAC,,, | Performed by: FAMILY MEDICINE

## 2019-07-02 PROCEDURE — 93010 EKG 12-LEAD: ICD-10-PCS | Mod: S$PBB,,, | Performed by: INTERNAL MEDICINE

## 2019-07-02 PROCEDURE — 99999 PR PBB SHADOW E&M-EST. PATIENT-LVL III: ICD-10-PCS | Mod: PBBFAC,,, | Performed by: FAMILY MEDICINE

## 2019-07-02 PROCEDURE — 99213 OFFICE O/P EST LOW 20 MIN: CPT | Mod: PBBFAC,25,PO | Performed by: FAMILY MEDICINE

## 2019-07-02 PROCEDURE — 93005 ELECTROCARDIOGRAM TRACING: CPT | Mod: PBBFAC,PO | Performed by: INTERNAL MEDICINE

## 2019-07-02 PROCEDURE — 99215 PR OFFICE/OUTPT VISIT, EST, LEVL V, 40-54 MIN: ICD-10-PCS | Mod: S$PBB,,, | Performed by: FAMILY MEDICINE

## 2019-07-02 PROCEDURE — 99215 OFFICE O/P EST HI 40 MIN: CPT | Mod: S$PBB,,, | Performed by: FAMILY MEDICINE

## 2019-07-02 RX ORDER — DULOXETIN HYDROCHLORIDE 30 MG/1
30 CAPSULE, DELAYED RELEASE ORAL DAILY
Qty: 30 CAPSULE | Refills: 11 | Status: SHIPPED | OUTPATIENT
Start: 2019-07-02 | End: 2020-08-31 | Stop reason: SDUPTHER

## 2019-07-02 RX ORDER — TRAZODONE HYDROCHLORIDE 50 MG/1
50 TABLET ORAL NIGHTLY
Qty: 30 TABLET | Refills: 11 | Status: SHIPPED | OUTPATIENT
Start: 2019-07-02 | End: 2019-07-29 | Stop reason: SDUPTHER
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
## 2019-07-08 ENCOUNTER — PATIENT MESSAGE (OUTPATIENT)
Dept: FAMILY MEDICINE | Facility: CLINIC | Age: 64
End: 2019-07-08

## 2019-07-09 ENCOUNTER — TELEPHONE (OUTPATIENT)
Dept: FAMILY MEDICINE | Facility: CLINIC | Age: 64
End: 2019-07-09

## 2019-07-09 NOTE — TELEPHONE ENCOUNTER
Returned pt phone call, he stated he was looking at his my chart and that on the EKG on there said that it was abnormal, he stated that on there dr gonsalez referred to dr darling. And he just wants to make sure that everything is okay and no follow up or anything is needed. Please Advise.

## 2019-07-09 NOTE — TELEPHONE ENCOUNTER
----- Message from Monica Curry sent at 7/9/2019 11:13 AM CDT -----  No. [phone]    Patient has a question about the EKG.   Please call.

## 2019-07-29 ENCOUNTER — PATIENT MESSAGE (OUTPATIENT)
Dept: FAMILY MEDICINE | Facility: CLINIC | Age: 64
End: 2019-07-29

## 2019-07-29 RX ORDER — TRAZODONE HYDROCHLORIDE 50 MG/1
50 TABLET ORAL NIGHTLY
Qty: 90 TABLET | Refills: 3 | Status: SHIPPED | OUTPATIENT
Start: 2019-07-29 | End: 2021-03-25 | Stop reason: SDUPTHER

## 2019-08-09 ENCOUNTER — OFFICE VISIT (OUTPATIENT)
Dept: FAMILY MEDICINE | Facility: CLINIC | Age: 64
End: 2019-08-09
Payer: MEDICARE

## 2019-08-09 VITALS
BODY MASS INDEX: 26.37 KG/M2 | SYSTOLIC BLOOD PRESSURE: 116 MMHG | TEMPERATURE: 98 F | DIASTOLIC BLOOD PRESSURE: 78 MMHG | WEIGHT: 194.69 LBS | HEIGHT: 72 IN | HEART RATE: 61 BPM | OXYGEN SATURATION: 98 %

## 2019-08-09 DIAGNOSIS — F33.1 MODERATE EPISODE OF RECURRENT MAJOR DEPRESSIVE DISORDER: Primary | ICD-10-CM

## 2019-08-09 DIAGNOSIS — G47.00 INSOMNIA, UNSPECIFIED TYPE: ICD-10-CM

## 2019-08-09 PROCEDURE — 99999 PR PBB SHADOW E&M-EST. PATIENT-LVL IV: CPT | Mod: PBBFAC,,, | Performed by: FAMILY MEDICINE

## 2019-08-09 PROCEDURE — 99213 PR OFFICE/OUTPT VISIT, EST, LEVL III, 20-29 MIN: ICD-10-PCS | Mod: S$PBB,,, | Performed by: FAMILY MEDICINE

## 2019-08-09 PROCEDURE — 99999 PR PBB SHADOW E&M-EST. PATIENT-LVL IV: ICD-10-PCS | Mod: PBBFAC,,, | Performed by: FAMILY MEDICINE

## 2019-08-09 PROCEDURE — 99214 OFFICE O/P EST MOD 30 MIN: CPT | Mod: PBBFAC,PO | Performed by: FAMILY MEDICINE

## 2019-08-09 PROCEDURE — 99213 OFFICE O/P EST LOW 20 MIN: CPT | Mod: S$PBB,,, | Performed by: FAMILY MEDICINE

## 2019-08-09 RX ORDER — AMMONIUM LACTATE 12 G/100G
LOTION TOPICAL
COMMUNITY
End: 2020-12-09

## 2019-08-09 RX ORDER — LORAZEPAM 1 MG/1
TABLET ORAL
COMMUNITY
Start: 2013-02-20 | End: 2019-08-09

## 2019-08-09 RX ORDER — METHYLPHENIDATE HYDROCHLORIDE 20 MG/1
TABLET ORAL
COMMUNITY
Start: 2013-02-20 | End: 2019-08-09

## 2019-08-09 NOTE — PROGRESS NOTES
(Portions of this note were dictated using voice recognition software and may contain dictation related errors in spelling/grammar/syntax not found on text review)    CC:   Chief Complaint   Patient presents with    Follow-up       HPI: 63 y.o. male Seen 07/02/2019 for concern for anxiety, depression, and insomnia.  Please see my detailed record from that date.  Insomnia  was likely worsening his anxiety and depression issues.  Reported symptoms feeling down, anhedonia, decreased concentration and focus, decreased energy.  No suicidal ideation, psychomotor retardation or agitation, issues with appetite.  Admitted to perseverating on past events.  Discussed talk therapy, Cymbalta 30 mg daily, titrated up as necessary, trazodone 50 mg at night to help with sleep.    Other  prior meds tried:  Hydroxyzine, made very groggy but did not quite help with sleep  Melatonin incompletely effective for sleep controlled  Lexapro:  ED  Zoloft:  ED  Wellbutrin:  Unsure effect profile      Interval history:  Mood is overall doing much better.  Tolerating Cymbalta 30 mg well.  Tolerating trazodone 50 mg nightly well.  Sleep is much improved.  Still gets some times where he does not sleep as well in sometimes mood can be down a little bit but it is much better than it was and he is happy to stay on his current doses.  No sexual dysfunction.  Slight diminishment in appetite but not pathologically so, he is happy that he has lost a little bit of weight.    Past Medical History:   Diagnosis Date    Elevated hemidiaphragm     HLD (hyperlipidemia)     Meniscal injury        Past Surgical History:   Procedure Laterality Date    COLONOSCOPY      NO PAST SURGERIES         Family History   Problem Relation Age of Onset    Skin cancer Father     Diabetes Neg Hx     Cancer Neg Hx     Coronary artery disease Neg Hx        Social History     Tobacco Use    Smoking status: Never Smoker    Smokeless tobacco: Never Used   Substance Use  Topics    Alcohol use: No     Frequency: Never     Drinks per session: Patient refused     Binge frequency: Never    Drug use: No     Answers for HPI/ROS submitted by the patient on 8/7/2019   activity change: No  unexpected weight change: No  neck pain: No  hearing loss: No  rhinorrhea: No  trouble swallowing: No  eye discharge: No  visual disturbance: No  chest tightness: No  wheezing: No  chest pain: No  palpitations: No  blood in stool: No  constipation: No  vomiting: No  diarrhea: No  polydipsia: No  polyuria: No  difficulty urinating: No  urgency: No  hematuria: No  joint swelling: No  arthralgias: No  headaches: No  weakness: No  confusion: No  dysphoric mood: Yes  .    Vital signs reviewed  PE:   APPEARANCE: Well nourished, well developed, in no acute distress.    HEAD: Normocephalic, atraumatic.  EYES:  Conjunctivae noninjected.  NECK: Supple with no cervical lymphadenopathy.    CHEST: Good inspiratory effort. Lungs clear to auscultation with no wheezes or crackles.  CARDIOVASCULAR: Normal S1, S2. No rubs, murmurs, or gallops.  ABDOMEN: Bowel sounds normal. Not distended. Soft. No tenderness or masses. No organomegaly.        IMPRESSION  1. Moderate episode of recurrent major depressive disorder    2. Insomnia, unspecified type            PLAN  Continue current meds (Cymbalta 30 mg daily, trazodone 50 mg nightly)    Notify for any worsening of sleep and/or mood concerns

## 2019-08-26 ENCOUNTER — PATIENT MESSAGE (OUTPATIENT)
Dept: FAMILY MEDICINE | Facility: CLINIC | Age: 64
End: 2019-08-26

## 2019-09-10 ENCOUNTER — DOCUMENTATION ONLY (OUTPATIENT)
Dept: ADMINISTRATIVE | Facility: HOSPITAL | Age: 64
End: 2019-09-10

## 2019-09-10 ENCOUNTER — PATIENT MESSAGE (OUTPATIENT)
Dept: ADMINISTRATIVE | Facility: HOSPITAL | Age: 64
End: 2019-09-10

## 2019-10-07 ENCOUNTER — LAB VISIT (OUTPATIENT)
Dept: LAB | Facility: HOSPITAL | Age: 64
End: 2019-10-07
Attending: FAMILY MEDICINE
Payer: MEDICARE

## 2019-10-07 DIAGNOSIS — Z12.9 CANCER SCREENING: ICD-10-CM

## 2019-10-07 PROCEDURE — 82274 ASSAY TEST FOR BLOOD FECAL: CPT

## 2019-10-11 LAB — HEMOCCULT STL QL IA: NEGATIVE

## 2019-10-18 ENCOUNTER — TELEPHONE (OUTPATIENT)
Dept: FAMILY MEDICINE | Facility: CLINIC | Age: 64
End: 2019-10-18

## 2019-12-04 ENCOUNTER — PATIENT MESSAGE (OUTPATIENT)
Dept: DERMATOLOGY | Facility: CLINIC | Age: 64
End: 2019-12-04

## 2019-12-04 RX ORDER — AMMONIUM LACTATE 12 G/100G
LOTION TOPICAL
Qty: 396 G | Refills: 6 | Status: SHIPPED | OUTPATIENT
Start: 2019-12-04 | End: 2021-02-08

## 2020-01-26 ENCOUNTER — PATIENT MESSAGE (OUTPATIENT)
Dept: FAMILY MEDICINE | Facility: CLINIC | Age: 65
End: 2020-01-26

## 2020-01-28 ENCOUNTER — TELEPHONE (OUTPATIENT)
Dept: FAMILY MEDICINE | Facility: CLINIC | Age: 65
End: 2020-01-28

## 2020-02-06 ENCOUNTER — TELEPHONE (OUTPATIENT)
Dept: FAMILY MEDICINE | Facility: CLINIC | Age: 65
End: 2020-02-06

## 2020-02-06 NOTE — TELEPHONE ENCOUNTER
----- Message from Adrianna Ortiz sent at 2/6/2020  9:28 AM CST -----  Pt need's a new authorization to get his Handicap Sticker Renew.    Please call and advise          Thank you

## 2020-02-06 NOTE — TELEPHONE ENCOUNTER
Spoke with patient and advised that I had messaged him wanting to know if he wanted the handicap paperwork mailed or left at the front for .  Patient stated he replied to have it mail.  I see where message was read but no reply attached.  Placed in mail today.

## 2020-06-29 ENCOUNTER — PATIENT MESSAGE (OUTPATIENT)
Dept: DERMATOLOGY | Facility: CLINIC | Age: 65
End: 2020-06-29

## 2020-06-30 RX ORDER — CLINDAMYCIN PHOSPHATE 11.9 MG/ML
SOLUTION TOPICAL
Qty: 60 ML | Refills: 3 | Status: SHIPPED | OUTPATIENT
Start: 2020-06-30 | End: 2022-02-25

## 2020-07-17 DIAGNOSIS — Z71.89 COMPLEX CARE COORDINATION: ICD-10-CM

## 2020-08-26 ENCOUNTER — TELEPHONE (OUTPATIENT)
Dept: FAMILY MEDICINE | Facility: CLINIC | Age: 65
End: 2020-08-26

## 2020-08-28 ENCOUNTER — OFFICE VISIT (OUTPATIENT)
Dept: FAMILY MEDICINE | Facility: CLINIC | Age: 65
End: 2020-08-28
Payer: MEDICARE

## 2020-08-28 VITALS
HEIGHT: 71 IN | BODY MASS INDEX: 27.19 KG/M2 | SYSTOLIC BLOOD PRESSURE: 116 MMHG | OXYGEN SATURATION: 97 % | DIASTOLIC BLOOD PRESSURE: 74 MMHG | WEIGHT: 194.25 LBS | HEART RATE: 63 BPM | TEMPERATURE: 98 F

## 2020-08-28 DIAGNOSIS — Z13.6 ENCOUNTER FOR SCREENING FOR CARDIOVASCULAR DISORDERS: ICD-10-CM

## 2020-08-28 DIAGNOSIS — R07.9 CHEST PAIN, UNSPECIFIED TYPE: Primary | ICD-10-CM

## 2020-08-28 DIAGNOSIS — G89.29 CHRONIC NONINTRACTABLE HEADACHE, UNSPECIFIED HEADACHE TYPE: ICD-10-CM

## 2020-08-28 DIAGNOSIS — R51.9 CHRONIC NONINTRACTABLE HEADACHE, UNSPECIFIED HEADACHE TYPE: ICD-10-CM

## 2020-08-28 DIAGNOSIS — R53.83 FATIGUE, UNSPECIFIED TYPE: ICD-10-CM

## 2020-08-28 DIAGNOSIS — R06.00 DYSPNEA, UNSPECIFIED TYPE: ICD-10-CM

## 2020-08-28 PROCEDURE — 99999 PR PBB SHADOW E&M-EST. PATIENT-LVL IV: ICD-10-PCS | Mod: PBBFAC,,, | Performed by: FAMILY MEDICINE

## 2020-08-28 PROCEDURE — 93005 ELECTROCARDIOGRAM TRACING: CPT | Mod: PBBFAC,PO | Performed by: INTERNAL MEDICINE

## 2020-08-28 PROCEDURE — 99999 PR PBB SHADOW E&M-EST. PATIENT-LVL IV: CPT | Mod: PBBFAC,,, | Performed by: FAMILY MEDICINE

## 2020-08-28 PROCEDURE — 99214 OFFICE O/P EST MOD 30 MIN: CPT | Mod: PBBFAC,PO | Performed by: FAMILY MEDICINE

## 2020-08-28 PROCEDURE — 93010 EKG 12-LEAD: ICD-10-PCS | Mod: S$PBB,,, | Performed by: INTERNAL MEDICINE

## 2020-08-28 PROCEDURE — 93010 ELECTROCARDIOGRAM REPORT: CPT | Mod: S$PBB,,, | Performed by: INTERNAL MEDICINE

## 2020-08-28 PROCEDURE — 99215 PR OFFICE/OUTPT VISIT, EST, LEVL V, 40-54 MIN: ICD-10-PCS | Mod: 25,S$PBB,, | Performed by: FAMILY MEDICINE

## 2020-08-28 PROCEDURE — 99215 OFFICE O/P EST HI 40 MIN: CPT | Mod: 25,S$PBB,, | Performed by: FAMILY MEDICINE

## 2020-08-28 NOTE — PROGRESS NOTES
(Portions of this note were dictated using voice recognition software and may contain dictation related errors in spelling/grammar/syntax not found on text review)    CC:   Chief Complaint   Patient presents with    Annual Exam       HPI: 64 y.o. male  annual exam    Anxiety/depression/insomnia.  At last visit in August started Cymbalta 30 mg daily and trazodone 50 mg daily (taking half a pill daily).  Had noticed improvement in mood.  However today he mentions that he stop Cymbalta, not exactly sure why.  He was having some symptoms that he was concerned could be related to trazodone including eye pain, headaches, shortness of breath, chest pain, muscle twitching in his arm and his left chest wall.  Of note many of these issues are chronic in nature and he is not sure if these had necessarily started with the trazodone or could have been present before or are not related altogether.  He was concerned when he pulled at the side effect profile from trazodone in noticed a lot of the symptoms on that list.  He has tried to go without the trazodone but has significant problem sleeping.  Has been taking 10 mg of melatonin.  He has tried to take different combinations of the trazodone melatonin but he finds that taking the 25 mg dose of trazodone along with 10 mg of melatonin helps the best with sleep.     Other  prior meds tried:  Hydroxyzine, made very groggy but did not quite help with sleep  Melatonin incompletely effective for sleep controlled  Lexapro:  ED  Zoloft:  ED  Wellbutrin:  Unsure effect profile      Dyslipidemia:  On Lipitor 10 mg daily    Elevated left hemidiaphragm, has chronic shortness of breath.  Had seen pulmonology in 2018.  6 min walk showed moderate dyspnea but no desaturation.  PSG showed sleep apnea and recommended CPAP.    Had outside methacholine challenge test which was nondiagnostic for asthma although did not preclude considering trial of beta agonist therapy.    Diet is regarded as poor.   Exercise:  None.  Patient has a torn meniscus and has not really been exercising but he does state that it feels better when he weight bears and worse when he does not have any pressure on the knee.  He does feel like he could probably start walking to see if this helps with exercise overall.        Past Medical History:   Diagnosis Date    Elevated hemidiaphragm     HLD (hyperlipidemia)     Meniscal injury        Past Surgical History:   Procedure Laterality Date    COLONOSCOPY      NO PAST SURGERIES         Family History   Problem Relation Age of Onset    Skin cancer Father     Diabetes Neg Hx     Cancer Neg Hx     Coronary artery disease Neg Hx        Social History     Tobacco Use    Smoking status: Never Smoker    Smokeless tobacco: Never Used   Substance Use Topics    Alcohol use: No     Frequency: Never     Drinks per session: Patient refused     Binge frequency: Never    Drug use: No       Lab Results   Component Value Date    WBC 4.14 02/27/2018    HGB 14.7 02/27/2018    HCT 43.9 02/27/2018    MCV 90 02/27/2018     02/27/2018    CHOL 192 02/27/2018    TRIG 62 02/27/2018    HDL 47 02/27/2018    ALT 18 02/27/2018    AST 19 02/27/2018    BILITOT 0.4 02/27/2018    ALKPHOS 70 02/27/2018     02/27/2018    K 4.2 02/27/2018     02/27/2018    CREATININE 1.3 02/27/2018    ESTGFRAFRICA >60 02/27/2018    EGFRNONAA 58 (A) 02/27/2018    CALCIUM 9.3 02/27/2018    ALBUMIN 3.7 02/27/2018    BUN 18 02/27/2018    CO2 26 02/27/2018    TSH 1.965 02/27/2018    PSA 2.7 02/27/2018    LDLCALC 132.6 02/27/2018     02/27/2018                 ROS:  GENERAL: No fever, chills, fatigability or weight loss.  SKIN: No rashes, no itching.  HEAD:  Headaches.  EYES:  Eye pain  EARS: No ear pain or changes in hearing.  NOSE: No congestion or rhinorrhea.  MOUTH & THROAT: No hoarseness, change in voice, or sore throat.  NODES: Denies swollen glands.  CHEST:  Above.  CARDIOVASCULAR:  Chest pain  ABDOMEN: No  nausea, vomiting, or changes in bowel function.  URINARY: No flank pain, dysuria or hematuria.  PERIPHERAL VASCULAR: No claudication or cyanosis.  MUSCULOSKELETAL:  Tore meniscus of the knee, has not been really exercise  NEUROLOGIC:  Occasional twitching right arm and left chest wall    Vital signs reviewed  PE:   APPEARANCE: Well nourished, well developed, in no acute distress.    HEAD: Normocephalic, atraumatic.  EYES: PERRL. EOMI.   Conjunctivae noninjected.  EARS: TM's intact. Light reflex normal. No retraction or perforation  NOSE: Mucosa pink. Airway clear.  MOUTH & THROAT: No tonsillar enlargement. No pharyngeal erythema or exudate.   NECK: Supple with no cervical lymphadenopathy.    CHEST: Good inspiratory effort. Lungs clear to auscultation with no wheezes or crackles.  CARDIOVASCULAR: Normal S1, S2. No rubs, murmurs, or gallops.  ABDOMEN: Bowel sounds normal. Not distended. Soft. No tenderness or masses. No organomegaly.  EXTREMITIES: No edema, cyanosis, or clubbing.      IMPRESSION  1. Chest pain, unspecified type    2. Fatigue, unspecified type    3. Chronic nonintractable headache, unspecified headache type    4. Dyspnea, unspecified type    5. Encounter for screening for cardiovascular disorders            PLAN  Labs below    EKG done in the office personally reviewed showing normal sinus rhythm, normal axis, normal intervals.  No acute ST or T-wave changes.    Given his concern for left-sided chest pain, along with his age, and history of chronic dyspnea , will get pharmacologic stress test (patient wanted to try to do this instead of the treadmill stress test as he was not sure how much he would be able to do with his knee)    Insomnia:  Trazodone plus melatonin seems to be helping.  With multiple other symptoms so he was concerned about potential side effects.  However, he does not know whether some of the symptoms like headache and chest discomfort and muscle twitching could also be anxiety  related.  Reviewed my medical record note from last year.  Patient had noted very good improvement in his overall mood after starting the Cymbalta and trazodone last year but he is not sure exactly why he stop the Cymbalta.  He still has some of this at home.  I would recommend trying to get back on Cymbalta 30 mg daily in case it does help with some physical symptoms if they might be more based as an anxiety manifestation    Orders Placed This Encounter   Procedures    NM Myocardial Perfusion Spect Multi Pharmacologic    CBC auto differential    Comprehensive metabolic panel    Lipid Panel    TSH    Nuclear Stress Test    EKG 12-lead         SCREENINGS  Colonoscopy around 9 yr ago, due back 2021  Prostate: sees urology, PSA just done outside facility    Tdap 2016  Pneumococcal vaccine due although counseling provided today but patient decline

## 2020-08-31 ENCOUNTER — LAB VISIT (OUTPATIENT)
Dept: LAB | Facility: HOSPITAL | Age: 65
End: 2020-08-31
Attending: FAMILY MEDICINE
Payer: MEDICARE

## 2020-08-31 DIAGNOSIS — R53.83 FATIGUE, UNSPECIFIED TYPE: ICD-10-CM

## 2020-08-31 DIAGNOSIS — G89.29 CHRONIC NONINTRACTABLE HEADACHE, UNSPECIFIED HEADACHE TYPE: ICD-10-CM

## 2020-08-31 DIAGNOSIS — R51.9 CHRONIC NONINTRACTABLE HEADACHE, UNSPECIFIED HEADACHE TYPE: ICD-10-CM

## 2020-08-31 DIAGNOSIS — R07.9 CHEST PAIN, UNSPECIFIED TYPE: ICD-10-CM

## 2020-08-31 DIAGNOSIS — R06.00 DYSPNEA, UNSPECIFIED TYPE: ICD-10-CM

## 2020-08-31 DIAGNOSIS — Z13.6 ENCOUNTER FOR SCREENING FOR CARDIOVASCULAR DISORDERS: ICD-10-CM

## 2020-08-31 PROCEDURE — 80053 COMPREHEN METABOLIC PANEL: CPT

## 2020-08-31 PROCEDURE — 84443 ASSAY THYROID STIM HORMONE: CPT

## 2020-08-31 PROCEDURE — 85025 COMPLETE CBC W/AUTO DIFF WBC: CPT

## 2020-08-31 PROCEDURE — 80061 LIPID PANEL: CPT

## 2020-08-31 PROCEDURE — 36415 COLL VENOUS BLD VENIPUNCTURE: CPT | Mod: PO

## 2020-08-31 RX ORDER — DULOXETIN HYDROCHLORIDE 30 MG/1
30 CAPSULE, DELAYED RELEASE ORAL DAILY
Qty: 30 CAPSULE | Refills: 11 | Status: SHIPPED | OUTPATIENT
Start: 2020-08-31 | End: 2021-03-25 | Stop reason: SDUPTHER

## 2020-09-01 LAB
ALBUMIN SERPL BCP-MCNC: 3.9 G/DL (ref 3.5–5.2)
ALP SERPL-CCNC: 66 U/L (ref 55–135)
ALT SERPL W/O P-5'-P-CCNC: 20 U/L (ref 10–44)
ANION GAP SERPL CALC-SCNC: 9 MMOL/L (ref 8–16)
AST SERPL-CCNC: 19 U/L (ref 10–40)
BASOPHILS # BLD AUTO: 0.03 K/UL (ref 0–0.2)
BASOPHILS NFR BLD: 0.5 % (ref 0–1.9)
BILIRUB SERPL-MCNC: 0.5 MG/DL (ref 0.1–1)
BUN SERPL-MCNC: 17 MG/DL (ref 8–23)
CALCIUM SERPL-MCNC: 9.3 MG/DL (ref 8.7–10.5)
CHLORIDE SERPL-SCNC: 106 MMOL/L (ref 95–110)
CHOLEST SERPL-MCNC: 208 MG/DL (ref 120–199)
CHOLEST/HDLC SERPL: 4.5 {RATIO} (ref 2–5)
CO2 SERPL-SCNC: 24 MMOL/L (ref 23–29)
CREAT SERPL-MCNC: 1.3 MG/DL (ref 0.5–1.4)
DIFFERENTIAL METHOD: ABNORMAL
EOSINOPHIL # BLD AUTO: 0.4 K/UL (ref 0–0.5)
EOSINOPHIL NFR BLD: 6.8 % (ref 0–8)
ERYTHROCYTE [DISTWIDTH] IN BLOOD BY AUTOMATED COUNT: 13.8 % (ref 11.5–14.5)
EST. GFR  (AFRICAN AMERICAN): >60 ML/MIN/1.73 M^2
EST. GFR  (NON AFRICAN AMERICAN): 56.9 ML/MIN/1.73 M^2
GLUCOSE SERPL-MCNC: 88 MG/DL (ref 70–110)
HCT VFR BLD AUTO: 46.9 % (ref 40–54)
HDLC SERPL-MCNC: 46 MG/DL (ref 40–75)
HDLC SERPL: 22.1 % (ref 20–50)
HGB BLD-MCNC: 14.7 G/DL (ref 14–18)
IMM GRANULOCYTES # BLD AUTO: 0.01 K/UL (ref 0–0.04)
IMM GRANULOCYTES NFR BLD AUTO: 0.2 % (ref 0–0.5)
LDLC SERPL CALC-MCNC: 140.8 MG/DL (ref 63–159)
LYMPHOCYTES # BLD AUTO: 1.2 K/UL (ref 1–4.8)
LYMPHOCYTES NFR BLD: 21.8 % (ref 18–48)
MCH RBC QN AUTO: 29.8 PG (ref 27–31)
MCHC RBC AUTO-ENTMCNC: 31.3 G/DL (ref 32–36)
MCV RBC AUTO: 95 FL (ref 82–98)
MONOCYTES # BLD AUTO: 0.5 K/UL (ref 0.3–1)
MONOCYTES NFR BLD: 9.5 % (ref 4–15)
NEUTROPHILS # BLD AUTO: 3.5 K/UL (ref 1.8–7.7)
NEUTROPHILS NFR BLD: 61.2 % (ref 38–73)
NONHDLC SERPL-MCNC: 162 MG/DL
NRBC BLD-RTO: 0 /100 WBC
PLATELET # BLD AUTO: 185 K/UL (ref 150–350)
PMV BLD AUTO: 11.6 FL (ref 9.2–12.9)
POTASSIUM SERPL-SCNC: 4.3 MMOL/L (ref 3.5–5.1)
PROT SERPL-MCNC: 7.1 G/DL (ref 6–8.4)
RBC # BLD AUTO: 4.94 M/UL (ref 4.6–6.2)
SODIUM SERPL-SCNC: 139 MMOL/L (ref 136–145)
TRIGL SERPL-MCNC: 106 MG/DL (ref 30–150)
TSH SERPL DL<=0.005 MIU/L-ACNC: 2.2 UIU/ML (ref 0.4–4)
WBC # BLD AUTO: 5.7 K/UL (ref 3.9–12.7)

## 2020-09-02 ENCOUNTER — PATIENT MESSAGE (OUTPATIENT)
Dept: FAMILY MEDICINE | Facility: CLINIC | Age: 65
End: 2020-09-02

## 2020-09-02 NOTE — TELEPHONE ENCOUNTER
Dear Melo Zuñiga    Your recent labs were reviewed and released to your account. Your lab results were normal overall. The kidney function was very slightly down but make sure to stay hydrated well and avoid meds like ibuprofen (advil/motrin) and naproxen (aleve) or significant quantities of aspirin containing products as these can affect kidney function. We'll continue to watch this on future lab work. Please let me know if you have any questions.    Johnie Magallanes MD

## 2020-10-02 ENCOUNTER — PATIENT MESSAGE (OUTPATIENT)
Dept: FAMILY MEDICINE | Facility: CLINIC | Age: 65
End: 2020-10-02

## 2020-10-02 ENCOUNTER — HOSPITAL ENCOUNTER (OUTPATIENT)
Dept: CARDIOLOGY | Facility: HOSPITAL | Age: 65
Discharge: HOME | End: 2020-10-02
Attending: FAMILY MEDICINE
Payer: MEDICARE

## 2020-10-02 ENCOUNTER — HOSPITAL ENCOUNTER (OUTPATIENT)
Dept: RADIOLOGY | Facility: HOSPITAL | Age: 65
Discharge: HOME | End: 2020-10-02
Attending: FAMILY MEDICINE
Payer: MEDICARE

## 2020-10-02 DIAGNOSIS — R53.83 FATIGUE, UNSPECIFIED TYPE: ICD-10-CM

## 2020-10-02 DIAGNOSIS — R94.39 ABNORMAL STRESS ECG: Primary | ICD-10-CM

## 2020-10-02 DIAGNOSIS — R94.39 ABNORMAL NUCLEAR STRESS TEST: ICD-10-CM

## 2020-10-02 DIAGNOSIS — R51.9 CHRONIC NONINTRACTABLE HEADACHE, UNSPECIFIED HEADACHE TYPE: ICD-10-CM

## 2020-10-02 DIAGNOSIS — Z13.6 ENCOUNTER FOR SCREENING FOR CARDIOVASCULAR DISORDERS: ICD-10-CM

## 2020-10-02 DIAGNOSIS — G89.29 CHRONIC NONINTRACTABLE HEADACHE, UNSPECIFIED HEADACHE TYPE: ICD-10-CM

## 2020-10-02 DIAGNOSIS — R07.9 CHEST PAIN, UNSPECIFIED TYPE: ICD-10-CM

## 2020-10-02 DIAGNOSIS — R06.00 DYSPNEA, UNSPECIFIED TYPE: ICD-10-CM

## 2020-10-02 LAB
CV PHARM DOSE: 0.4 MG
CV STRESS BASE HR: 48 BPM
DIASTOLIC BLOOD PRESSURE: 90 MMHG
OHS CV CPX 85 PERCENT MAX PREDICTED HEART RATE MALE: 131
OHS CV CPX MAX PREDICTED HEART RATE: 154
OHS CV CPX PATIENT IS FEMALE: 0
OHS CV CPX PATIENT IS MALE: 1
OHS CV CPX PEAK DIASTOLIC BLOOD PRESSURE: 90 MMHG
OHS CV CPX PEAK HEAR RATE: 75 BPM
OHS CV CPX PEAK RATE PRESSURE PRODUCT: NORMAL
OHS CV CPX PEAK SYSTOLIC BLOOD PRESSURE: 138 MMHG
OHS CV CPX PERCENT MAX PREDICTED HEART RATE ACHIEVED: 49
OHS CV CPX RATE PRESSURE PRODUCT PRESENTING: 6624
SYSTOLIC BLOOD PRESSURE: 138 MMHG

## 2020-10-02 PROCEDURE — 93018 CV STRESS TEST I&R ONLY: CPT | Mod: HCNC,,, | Performed by: INTERNAL MEDICINE

## 2020-10-02 PROCEDURE — 78452 NM MYOCARDIAL PERFUSION SPECT MULTI PHARM: ICD-10-PCS | Mod: 26,HCNC,, | Performed by: RADIOLOGY

## 2020-10-02 PROCEDURE — 93016 CV STRESS TEST SUPVJ ONLY: CPT | Mod: HCNC,,, | Performed by: INTERNAL MEDICINE

## 2020-10-02 PROCEDURE — 63600175 PHARM REV CODE 636 W HCPCS: Mod: HCNC | Performed by: FAMILY MEDICINE

## 2020-10-02 PROCEDURE — 93016 NUCLEAR STRESS TEST (CUPID ONLY): ICD-10-PCS | Mod: HCNC,,, | Performed by: INTERNAL MEDICINE

## 2020-10-02 PROCEDURE — A9502 TC99M TETROFOSMIN: HCPCS | Mod: HCNC

## 2020-10-02 PROCEDURE — 93017 CV STRESS TEST TRACING ONLY: CPT | Mod: HCNC

## 2020-10-02 PROCEDURE — 78452 HT MUSCLE IMAGE SPECT MULT: CPT | Mod: 26,HCNC,, | Performed by: RADIOLOGY

## 2020-10-02 PROCEDURE — 93018 NUCLEAR STRESS TEST (CUPID ONLY): ICD-10-PCS | Mod: HCNC,,, | Performed by: INTERNAL MEDICINE

## 2020-10-02 RX ORDER — REGADENOSON 0.08 MG/ML
0.4 INJECTION, SOLUTION INTRAVENOUS ONCE
Status: COMPLETED | OUTPATIENT
Start: 2020-10-02 | End: 2020-10-02

## 2020-10-02 RX ADMIN — REGADENOSON 0.4 MG: 0.08 INJECTION, SOLUTION INTRAVENOUS at 02:10

## 2020-10-02 NOTE — TELEPHONE ENCOUNTER
Dear Melo Zuñiga    Your recent stress test did show areas that appeared to show decreased blood flow 2 parts of the heart.  None of it seemed like it was particularly new or emergent but at the same time I think we should have you see a cardiologist to evaluate this and see if you need any further testing.      Johnie Magallanes MD      Please have patient follow-up with Cardiology, referral placed.

## 2020-10-05 ENCOUNTER — TELEPHONE (OUTPATIENT)
Dept: FAMILY MEDICINE | Facility: CLINIC | Age: 65
End: 2020-10-05

## 2020-11-03 ENCOUNTER — PATIENT OUTREACH (OUTPATIENT)
Dept: ADMINISTRATIVE | Facility: OTHER | Age: 65
End: 2020-11-03

## 2020-11-03 NOTE — PROGRESS NOTES
Health Maintenance Due   Topic Date Due    Shingles Vaccine (1 of 2) 11/06/2003    Influenza Vaccine (1) 08/01/2020     Updates were requested from care everywhere.  Chart was reviewed for overdue Proactive [facility] Encounters ([facility]) topics (CRS, Breast Cancer Screening, Eye exam)  Health Maintenance has been updated.  LINKS immunization registry triggered.  Immunizations were reconciled.

## 2020-11-05 ENCOUNTER — OFFICE VISIT (OUTPATIENT)
Dept: CARDIOLOGY | Facility: CLINIC | Age: 65
End: 2020-11-05
Payer: MEDICARE

## 2020-11-05 VITALS
BODY MASS INDEX: 27.6 KG/M2 | OXYGEN SATURATION: 97 % | WEIGHT: 197.88 LBS | DIASTOLIC BLOOD PRESSURE: 82 MMHG | SYSTOLIC BLOOD PRESSURE: 118 MMHG | HEART RATE: 65 BPM

## 2020-11-05 DIAGNOSIS — E78.5 HYPERLIPIDEMIA, UNSPECIFIED HYPERLIPIDEMIA TYPE: Primary | ICD-10-CM

## 2020-11-05 DIAGNOSIS — R06.09 DYSPNEA ON EXERTION: ICD-10-CM

## 2020-11-05 DIAGNOSIS — J98.6 ELEVATED HEMIDIAPHRAGM: ICD-10-CM

## 2020-11-05 DIAGNOSIS — G47.33 OSA (OBSTRUCTIVE SLEEP APNEA): ICD-10-CM

## 2020-11-05 DIAGNOSIS — R07.89 OTHER CHEST PAIN: ICD-10-CM

## 2020-11-05 DIAGNOSIS — Q79.1 DIAPHRAGMATIC EVENTRATION: ICD-10-CM

## 2020-11-05 DIAGNOSIS — R94.39 ABNORMAL NUCLEAR STRESS TEST: ICD-10-CM

## 2020-11-05 PROCEDURE — 1126F PR PAIN SEVERITY QUANTIFIED, NO PAIN PRESENT: ICD-10-PCS | Mod: HCNC,S$GLB,, | Performed by: INTERNAL MEDICINE

## 2020-11-05 PROCEDURE — 1159F MED LIST DOCD IN RCRD: CPT | Mod: HCNC,S$GLB,, | Performed by: INTERNAL MEDICINE

## 2020-11-05 PROCEDURE — 1126F AMNT PAIN NOTED NONE PRSNT: CPT | Mod: HCNC,S$GLB,, | Performed by: INTERNAL MEDICINE

## 2020-11-05 PROCEDURE — 3008F BODY MASS INDEX DOCD: CPT | Mod: HCNC,CPTII,S$GLB, | Performed by: INTERNAL MEDICINE

## 2020-11-05 PROCEDURE — 1101F PT FALLS ASSESS-DOCD LE1/YR: CPT | Mod: HCNC,CPTII,S$GLB, | Performed by: INTERNAL MEDICINE

## 2020-11-05 PROCEDURE — 99499 RISK ADDL DX/OHS AUDIT: ICD-10-PCS | Mod: S$GLB,,, | Performed by: INTERNAL MEDICINE

## 2020-11-05 PROCEDURE — 99204 PR OFFICE/OUTPT VISIT, NEW, LEVL IV, 45-59 MIN: ICD-10-PCS | Mod: HCNC,S$GLB,, | Performed by: INTERNAL MEDICINE

## 2020-11-05 PROCEDURE — 99999 PR PBB SHADOW E&M-EST. PATIENT-LVL IV: CPT | Mod: PBBFAC,HCNC,, | Performed by: INTERNAL MEDICINE

## 2020-11-05 PROCEDURE — 99999 PR PBB SHADOW E&M-EST. PATIENT-LVL IV: ICD-10-PCS | Mod: PBBFAC,HCNC,, | Performed by: INTERNAL MEDICINE

## 2020-11-05 PROCEDURE — 3008F PR BODY MASS INDEX (BMI) DOCUMENTED: ICD-10-PCS | Mod: HCNC,CPTII,S$GLB, | Performed by: INTERNAL MEDICINE

## 2020-11-05 PROCEDURE — 1101F PR PT FALLS ASSESS DOC 0-1 FALLS W/OUT INJ PAST YR: ICD-10-PCS | Mod: HCNC,CPTII,S$GLB, | Performed by: INTERNAL MEDICINE

## 2020-11-05 PROCEDURE — 99204 OFFICE O/P NEW MOD 45 MIN: CPT | Mod: HCNC,S$GLB,, | Performed by: INTERNAL MEDICINE

## 2020-11-05 PROCEDURE — 99499 UNLISTED E&M SERVICE: CPT | Mod: S$GLB,,, | Performed by: INTERNAL MEDICINE

## 2020-11-05 PROCEDURE — 1159F PR MEDICATION LIST DOCUMENTED IN MEDICAL RECORD: ICD-10-PCS | Mod: HCNC,S$GLB,, | Performed by: INTERNAL MEDICINE

## 2020-11-05 NOTE — LETTER
November 5, 2020      Johnie Magallanes MD  [address]  [address]  [facility] [address]           [place] [facility] Cardiology  [address]  [facility] [address]  Phone: [phone]          Patient: Melo Zuñiga   MR Number: [id number]   YOB: 1955   Date of Visit: 11/5/2020       Dear Dr. Johnie Magallanes:    Thank you for referring Melo Zuñiga to me for evaluation. Attached you will find relevant portions of my assessment and plan of care.    If you have questions, please do not hesitate to call me. I look forward to following Melo Zuñiga along with you.    Sincerely,    Tod Fischer MD    Enclosure  CC:  No Recipients    If you would like to receive this communication electronically, please contact [email or web address] or [phone] to request more information on [employer] Link access.    For providers and/or their staff who would like to refer a patient to [facility], please contact us through our one-stop-shop provider referral line, [facility], at [phone].    If you feel you have received this communication in error or would no longer like to receive these types of communications, please e-mail [email or web address]

## 2020-11-05 NOTE — PATIENT INSTRUCTIONS
Aerobic Exercise for a Healthy Heart  Exercise is a lot more than an energy booster and a stress reliever. It also strengthens your heart muscle, lowers your blood pressure and cholesterol, and burns calories. It can also improve your resting muscle tone, and your mood.     Remember, some activity is better than none.    Choose an aerobic activity  Choose an activity that makes your heart and lungs work harder than they do when you rest or walk normally. This aerobic exercise can improve the way your heart and other muscles use oxygen. Make it fun by exercising with a friend and choosing an activity you enjoy. Here are some ideas:  · Walking  · Swimming  · Bicycling  · Stair climbing  · Dancing  · Jogging  · Gardening  Exercise regularly  If you havent been exercising regularly,  get your doctors OK first. Then start slowly.  Here are some tips:  · Begin exercising 3 times a week for 5 to 10 minutes at a time.  · When you feel comfortable, add a few minutes each session.  · Slowly build up to exercising 3 to 4 times each week. Each session should last for 40 minutes, on average, and involve moderate- to vigorous-intensity physical activity.  · If you have been given nitroglycerin, be sure to carry it when you exercise.  · If you get chest pain (angina) when youre exercising, stop what youre doing, take your nitroglycerin, and call your doctor.  Date Last Reviewed: 6/2/2016  © 2466-7814 [employer]. [address] [place] [address]. All rights reserved. This information is not intended as a substitute for professional medical care. Always follow your healthcare professional's instructions.          Eating Heart-Healthy Foods  Eating has a big impact on your heart health. In fact, eating healthier can improve several of your heart risks at once. For instance, it helps you manage weight, cholesterol, and blood pressure. Here are ideas to help you make heart-healthy changes without giving up  all the foods and flavors you love.  Getting started  · Talk with your health care provider about eating plans, such as the DASH or Mediterranean diet. You may also be referred to a dietitian.  · Change a few things at a time. Give yourself time to get used to a few eating changes before adding more.  · Work to create a tasty, healthy eating plan that you can stick to for the rest of your life.    Goals for healthy eating  Below are some tips to improve your eating habits:  · Limit saturated fats and trans fats. Saturated fats raise your levels of cholesterol, so keep these fats to a minimum. They are found in foods such as fatty meats, whole milk, cheese, and palm and coconut oils. Avoid trans fats because they lower good cholesterol as well as raise bad cholesterol. Trans fats are most often found in processed foods.  · Reduce sodium (salt) intake. Eating too much salt may increase your blood pressure. Limit your sodium intake to 2,300 milligrams (mg) per day, or less if your health care provider recommends it. Dining out less often and eating fewer processed foods are two great ways to decrease the amount of salt you consume.  · Managing calories. A calorie is a unit of energy. Your body burns calories for fuel, but if you eat more calories than your body burns, the extras are stored as fat. Your health care provider can help you create a diet plan to manage your calories. This will likely include eating healthier foods as well as exercising regularly. To help you track your progress, keep a diary to record what you eat and how often you exercise.  Choose the right foods  Aim to make these foods staples of your diet. If you have diabetes, you may have different recommendations than what is listed here:  · Fruits and vegetable provide plenty of nutrients without a lot of calories. At meals, fill half your plate with these foods. Split the other half of your plate between whole grains and lean protein.  · Whole  grains are high in fiber and rich in vitamins and nutrients. Good choices include whole-wheat bread, pasta, and brown rice.  · Lean proteins give you nutrition with less fat. Good choices include fish, skinless chicken, and beans.  · Low-fat or nonfat dairy provides nutrients without a lot of fat. Try low-fat or nonfat milk, cheese, or yogurt.  · Healthy fats can be good for you in small amounts. These are unsaturated fats, such as olive oil, nuts, and fish. Try to have at least 2 servings per week of fatty fish such as salmon, sardines, mackerel, rainbow trout, and albacore tuna. These contain omega-3 fatty acids, which are good for your heart. Flaxseed is another source of a heart-healthy fat.  More on heart healthy eating    Read food labels  Healthy eating starts at the grocery store. Be sure to pay attention to food labels on packaged foods. Look for products that are high in fiber and protein, and low in saturated fat, cholesterol, and sodium. Avoid products that contain trans fat. And pay close attention to serving size. For instance, if you plan to eat two servings, double all the numbers on the label.  Prepare food right  A key part of healthy cooking is cutting down on added fat and salt. Look on the internet for lower-fat, lower-sodium recipes. Also, try these tips:  · Remove fat from meat and skin from poultry before cooking.  · Skim fat from the surface of soups and sauces.  · Broil, boil, bake, steam, grill, and microwave food without added fats.  · Choose ingredients that spice up your food without adding calories, fat, or sodium. Try these items: horseradish, hot sauce, lemon, mustard, nonfat salad dressings, and vinegar. For salt-free herbs and spices, try basil, cilantro, cinnamon, pepper, and rosemary.  Date Last Reviewed: 6/25/2015  © 3021-7498 [employer]. [address] [place] [address], [place] [address]. All rights reserved. This information is not intended as a substitute for  professional medical care. Always follow your healthcare professional's instructions.

## 2020-11-05 NOTE — PROGRESS NOTES
Subjective:   @Patient ID:  Melo Zuñiga is a 64 y.o. male who presents for evaluation of Abnormal stress test     HPI:   Initial evaluation for abnormal stress test   The test was done because of intermittent chest pain. He had chronic depression and usually when he feels lonely he gets this symptoms.   He is active in the yard with no limiting symptoms  No prior cardiac hx  He had elevated diaphragm.   No evidence of prior MI in the EKG  He is concerned about the test results.     No tobacco abuse, no sig FH of cardiac disease.       HLP, JILLIAN.      Prior cardiovascular  Hx  --------------------------------  - Stress test 10/2020  Moderate-to-large fixed perfusion defect involving the inferolateral walls anteriorly extending to the apex with smaller moderate size fixed perfusion defect involving the anterior wall at its mid aspect compatible with prior infarcts.  No evidence for reversible ischemia. Left ventricular ejection fraction approximately 60-65%.         - EKG Sinus bradycardia, no acute ST-T changes       Patient Active Problem List    Diagnosis Date Noted    JILLIAN (obstructive sleep apnea)     Diaphragmatic eventration     Dyspnea on exertion     Dysphonia 07/19/2016    Vocal cord paralysis 07/19/2016    HLD (hyperlipidemia)     Elevated hemidiaphragm                     LAST HbA1c  No results found for: HGBA1C    Lipid panel  Lab Results   Component Value Date    CHOL 208 (H) 08/31/2020    CHOL 192 02/27/2018    CHOL 225 (H) 11/02/2016     Lab Results   Component Value Date    HDL 46 08/31/2020    HDL 47 02/27/2018    HDL 45 11/02/2016     Lab Results   Component Value Date    LDLCALC 140.8 08/31/2020    LDLCALC 132.6 02/27/2018    LDLCALC 156.8 11/02/2016     Lab Results   Component Value Date    TRIG 106 08/31/2020    TRIG 62 02/27/2018    TRIG 116 11/02/2016     Lab Results   Component Value Date    CHOLHDL 22.1 08/31/2020    CHOLHDL 24.5 02/27/2018    CHOLHDL 20.0 11/02/2016            Review  of Systems   Constitution: Negative for chills and fever.   HENT: Negative for hearing loss and nosebleeds.    Eyes: Negative for blurred vision.   Cardiovascular: Positive for chest pain. Negative for leg swelling and palpitations.   Respiratory: Negative for hemoptysis and shortness of breath.    Hematologic/Lymphatic: Negative for bleeding problem.   Skin: Negative for itching.   Musculoskeletal: Negative for falls.   Gastrointestinal: Negative for abdominal pain and hematochezia.   Genitourinary: Negative for hematuria.   Neurological: Negative for dizziness and loss of balance.   Psychiatric/Behavioral: Negative for altered mental status and suicidal ideas.       Objective:   Physical Exam   Constitutional: He is oriented to person, place, and time. He appears well-developed and well-nourished.   HENT:   Head: Normocephalic and atraumatic.   Eyes: Conjunctivae are normal.   Neck: Neck supple. No JVD present. Carotid bruit is not present.   Cardiovascular: Normal rate, regular rhythm and normal heart sounds. Exam reveals no gallop and no friction rub.   No murmur heard.  Pulses:       Carotid pulses are 2+ on the right side and 2+ on the left side.       Radial pulses are 2+ on the right side and 2+ on the left side.   Pulmonary/Chest: Effort normal and breath sounds normal. No stridor. No respiratory distress. He has no wheezes.   Neurological: He is alert and oriented to person, place, and time.   Skin: Skin is warm and dry.   Psychiatric: He has a normal mood and affect. His behavior is normal.       Assessment:     1. Hyperlipidemia, unspecified hyperlipidemia type    2. Abnormal nuclear stress test    3. Other chest pain    4. Diaphragmatic eventration    5. JILLIAN (obstructive sleep apnea)    6. Dyspnea on exertion    7. Elevated hemidiaphragm        Plan:   - I have reviewed, the stress MPI images, and the EKG. discrepancy between the image findings and the EKG findings.  Could be false positive from  elevated diaphragm and diaphragmatic attenuation.  No reversible defects. Given the discrepancy, and patient's concerns will proceed with Coronary CTA to evaluate coronary anatomy and any evidence of atherosclerosis. Also check Echo for wma.     - Meanwhile continue ASA and Statin.   - Risk factors modifications  - F/U after the tests completed.       Pertinent cardiac images and EKG reviewed independently.    Continue with current medical plan and lifestyle changes.  Return sooner for concerns or questions. If symptoms persist go to the ED  I have reviewed all pertinent data including patient's medical history in detail and updated the computerized patient record.     Orders Placed This Encounter   Procedures    CTA Cardiac     Standing Status:   Future     Standing Expiration Date:   11/5/2021     Order Specific Question:   Is the patient allergic to iodine or contrast?     Answer:   No     Order Specific Question:   Is the patient on ANY Metformin drug such as Glucophage/Glucovance?           Should be off drug 48 hours after contrast. Check renal function before restart.     Answer:   No     Order Specific Question:   History of Kidney Disease - including: decreased kidney function, dialysis, kidney transplay, single kidney, kidney cancer, kidney surgery?     Answer:   None     Order Specific Question:   Does the patient have high blood pressure requiring medical treatment?     Answer:   No     Order Specific Question:   Diabetes?     Answer:   No     Order Specific Question:   May the Radiologist modify the order per protocol to meet the clinical needs of the patient?     Answer:   Yes    Echo Color Flow Doppler? Yes     Standing Status:   Future     Standing Expiration Date:   11/5/2021     Order Specific Question:   Color Flow Doppler?     Answer:   Yes       Follow up as scheduled.     He expressed verbal understanding and agreed with the plan    Patient's Medications   New Prescriptions    No medications  on file   Previous Medications    AMMONIUM LACTATE (LAC-HYDRIN) 12 % LOTION    ammonium lactate 12 % lotion    AMMONIUM LACTATE (LAC-HYDRIN) 12 % LOTION    Apply to affected areas qhs after bath or shower    ATORVASTATIN (LIPITOR) 10 MG TABLET    Take 10 mg by mouth every other day. Pt taking 5mg    CLINDAMYCIN (CLEOCIN T) 1 % EXTERNAL SOLUTION    AAA qd- bid prn flare    DULOXETINE (CYMBALTA) 30 MG CAPSULE    Take 1 capsule (30 mg total) by mouth once daily.    TRAZODONE (DESYREL) 50 MG TABLET    Take 1 tablet (50 mg total) by mouth every evening.   Modified Medications    No medications on file   Discontinued Medications    No medications on file

## 2020-11-10 RX ORDER — METOPROLOL TARTRATE 25 MG/1
25 TABLET, FILM COATED ORAL ONCE
Qty: 1 TABLET | Refills: 0 | Status: SHIPPED | OUTPATIENT
Start: 2020-11-10 | End: 2021-01-25 | Stop reason: SDUPTHER

## 2020-11-10 NOTE — TELEPHONE ENCOUNTER
----- Message from Kait Todd MA sent at 11/10/2020  2:34 PM CST -----  Pt of yours who is having a ct scan of CTA cardiac on Wed.11/18 that I scheduled. Pt needs instruction and  script for Metoprolol to take before test per CT scan dept. Use [facility] in [place] [phone].Please call call pt.

## 2020-11-18 ENCOUNTER — HOSPITAL ENCOUNTER (OUTPATIENT)
Dept: RADIOLOGY | Facility: HOSPITAL | Age: 65
Discharge: HOME | End: 2020-11-18
Attending: INTERNAL MEDICINE
Payer: MEDICARE

## 2020-11-18 ENCOUNTER — HOSPITAL ENCOUNTER (OUTPATIENT)
Dept: CARDIOLOGY | Facility: HOSPITAL | Age: 65
Discharge: HOME | End: 2020-11-18
Attending: INTERNAL MEDICINE
Payer: MEDICARE

## 2020-11-18 VITALS
OXYGEN SATURATION: 100 % | HEART RATE: 54 BPM | RESPIRATION RATE: 18 BRPM | SYSTOLIC BLOOD PRESSURE: 120 MMHG | DIASTOLIC BLOOD PRESSURE: 63 MMHG

## 2020-11-18 VITALS
WEIGHT: 197 LBS | HEIGHT: 73 IN | SYSTOLIC BLOOD PRESSURE: 120 MMHG | HEART RATE: 52 BPM | DIASTOLIC BLOOD PRESSURE: 78 MMHG | BODY MASS INDEX: 26.11 KG/M2

## 2020-11-18 DIAGNOSIS — R94.39 ABNORMAL NUCLEAR STRESS TEST: ICD-10-CM

## 2020-11-18 DIAGNOSIS — R07.89 OTHER CHEST PAIN: ICD-10-CM

## 2020-11-18 LAB
ASCENDING AORTA: 3.34 CM
AV INDEX (PROSTH): 0.98
AV MEAN GRADIENT: 3 MMHG
AV PEAK GRADIENT: 7 MMHG
AV VALVE AREA: 3.55 CM2
AV VELOCITY RATIO: 0.95
BSA FOR ECHO PROCEDURE: 2.15 M2
CREAT SERPL-MCNC: 1.2 MG/DL (ref 0.5–1.4)
CV ECHO LV RWT: 0.36 CM
DOP CALC AO PEAK VEL: 1.28 M/S
DOP CALC AO VTI: 26.42 CM
DOP CALC LVOT AREA: 3.6 CM2
DOP CALC LVOT DIAMETER: 2.15 CM
DOP CALC LVOT PEAK VEL: 1.22 M/S
DOP CALC LVOT STROKE VOLUME: 93.91 CM3
DOP CALCLVOT PEAK VEL VTI: 25.88 CM
E WAVE DECELERATION TIME: 211.87 MSEC
E/A RATIO: 0.89
E/E' RATIO: 10.25 M/S
ECHO LV POSTERIOR WALL: 0.77 CM (ref 0.6–1.1)
FRACTIONAL SHORTENING: 39 % (ref 28–44)
INTERVENTRICULAR SEPTUM: 0.84 CM (ref 0.6–1.1)
LA MAJOR: 4.01 CM
LA MINOR: 5.3 CM
LA WIDTH: 2.96 CM
LEFT ATRIUM SIZE: 3.54 CM
LEFT ATRIUM VOLUME INDEX: 19 ML/M2
LEFT ATRIUM VOLUME: 40.66 CM3
LEFT INTERNAL DIMENSION IN SYSTOLE: 2.66 CM (ref 2.1–4)
LEFT VENTRICLE DIASTOLIC VOLUME INDEX: 39.53 ML/M2
LEFT VENTRICLE DIASTOLIC VOLUME: 84.51 ML
LEFT VENTRICLE MASS INDEX: 50 G/M2
LEFT VENTRICLE SYSTOLIC VOLUME INDEX: 12.2 ML/M2
LEFT VENTRICLE SYSTOLIC VOLUME: 25.98 ML
LEFT VENTRICULAR INTERNAL DIMENSION IN DIASTOLE: 4.33 CM (ref 3.5–6)
LEFT VENTRICULAR MASS: 107.43 G
LV LATERAL E/E' RATIO: 11.71 M/S
LV SEPTAL E/E' RATIO: 9.11 M/S
MV PEAK A VEL: 0.92 M/S
MV PEAK E VEL: 0.82 M/S
MV STENOSIS PRESSURE HALF TIME: 61.44 MS
MV VALVE AREA P 1/2 METHOD: 3.58 CM2
PISA TR MAX VEL: 1.85 M/S
PULM VEIN S/D RATIO: 1.16
PV PEAK D VEL: 0.44 M/S
PV PEAK S VEL: 0.51 M/S
RA MAJOR: 3.17 CM
RA PRESSURE: 8 MMHG
RA WIDTH: 2.57 CM
RIGHT VENTRICULAR END-DIASTOLIC DIMENSION: 3.25 CM
RV TISSUE DOPPLER FREE WALL SYSTOLIC VELOCITY 1 (APICAL 4 CHAMBER VIEW): 9.65 CM/S
SAMPLE: NORMAL
SINUS: 3.31 CM
STJ: 3.34 CM
TDI LATERAL: 0.07 M/S
TDI SEPTAL: 0.09 M/S
TDI: 0.08 M/S
TR MAX PG: 14 MMHG
TRICUSPID ANNULAR PLANE SYSTOLIC EXCURSION: 2.02 CM
TV REST PULMONARY ARTERY PRESSURE: 22 MMHG

## 2020-11-18 PROCEDURE — 25500020 PHARM REV CODE 255: Mod: HCNC | Performed by: INTERNAL MEDICINE

## 2020-11-18 PROCEDURE — 75574 CT ANGIO HRT W/3D IMAGE: CPT | Mod: 26,HCNC,, | Performed by: RADIOLOGY

## 2020-11-18 PROCEDURE — 75574 CT ANGIO HRT W/3D IMAGE: CPT | Mod: TC,HCNC

## 2020-11-18 PROCEDURE — 25000242 PHARM REV CODE 250 ALT 637 W/ HCPCS: Mod: HCNC | Performed by: INTERNAL MEDICINE

## 2020-11-18 PROCEDURE — 75574 CTA CARDIAC: ICD-10-PCS | Mod: 26,HCNC,, | Performed by: RADIOLOGY

## 2020-11-18 PROCEDURE — 93306 ECHO (CUPID ONLY): ICD-10-PCS | Mod: 26,HCNC,, | Performed by: INTERNAL MEDICINE

## 2020-11-18 PROCEDURE — 93306 TTE W/DOPPLER COMPLETE: CPT | Mod: HCNC

## 2020-11-18 PROCEDURE — 93306 TTE W/DOPPLER COMPLETE: CPT | Mod: 26,HCNC,, | Performed by: INTERNAL MEDICINE

## 2020-11-18 RX ORDER — NITROGLYCERIN 0.4 MG/1
0.4 TABLET SUBLINGUAL EVERY 5 MIN PRN
Status: DISCONTINUED | OUTPATIENT
Start: 2020-11-18 | End: 2020-11-19 | Stop reason: HOSPADM

## 2020-11-18 RX ADMIN — IOHEXOL 100 ML: 350 INJECTION, SOLUTION INTRAVENOUS at 10:11

## 2020-11-18 RX ADMIN — NITROGLYCERIN 0.4 MG: 0.4 TABLET SUBLINGUAL at 10:11

## 2020-11-18 NOTE — PROGRESS NOTES
Pt in CT for Cardiac Test / introduced self to patient and verified name and  / pt AAO w/ NAD and IV established / I-Stat 1.2 done and pt to monitor / NIBP and Sat / VS to flow sheet

## 2020-11-18 NOTE — PROGRESS NOTES
Ready for Nitro SL and pt admits to Viagra use about 30 days ago , CT on hold call placed to Dr Christian and informed of pt medications and V/S cleared to give nitro / pt tolerated well and NAD noted / VS to flow sheet

## 2020-11-19 ENCOUNTER — TELEPHONE (OUTPATIENT)
Dept: CARDIOLOGY | Facility: CLINIC | Age: 65
End: 2020-11-19

## 2020-11-19 NOTE — TELEPHONE ENCOUNTER
Pt reached out to the office requesting results of the creatinine lab and echo test that was done     Advised pt we would give him a call back with finalized results

## 2020-11-30 ENCOUNTER — TELEPHONE (OUTPATIENT)
Dept: PODIATRY | Facility: CLINIC | Age: 65
End: 2020-11-30

## 2020-11-30 NOTE — TELEPHONE ENCOUNTER
----- Message from Estephanie Ortega sent at 11/30/2020  4:34 PM CST -----  Regarding: advice  Contact: Patient  Patient needs nurse to call about past appointment. Please call patient [phone] (home)

## 2020-12-08 ENCOUNTER — PATIENT OUTREACH (OUTPATIENT)
Dept: ADMINISTRATIVE | Facility: OTHER | Age: 65
End: 2020-12-08

## 2020-12-08 NOTE — PROGRESS NOTES
Health Maintenance Due   Topic Date Due    Shingles Vaccine (1 of 2) 11/06/2003    Influenza Vaccine (1) 08/01/2020     Updates were requested from care everywhere.  Chart was reviewed for overdue Proactive [facility] Encounters ([facility]) topics (CRS, Breast Cancer Screening, Eye exam)  Health Maintenance has been updated.  LINKS immunization registry triggered.  LINKS not responding.

## 2020-12-09 ENCOUNTER — OFFICE VISIT (OUTPATIENT)
Dept: PODIATRY | Facility: CLINIC | Age: 65
End: 2020-12-09
Payer: MEDICARE

## 2020-12-09 VITALS — BODY MASS INDEX: 26.12 KG/M2 | HEIGHT: 73 IN | WEIGHT: 197.06 LBS

## 2020-12-09 DIAGNOSIS — B35.1 ONYCHOMYCOSIS DUE TO DERMATOPHYTE: ICD-10-CM

## 2020-12-09 DIAGNOSIS — R52 PAIN IN SCAR: Primary | ICD-10-CM

## 2020-12-09 DIAGNOSIS — L90.5 PAIN IN SCAR: Primary | ICD-10-CM

## 2020-12-09 PROCEDURE — 99213 PR OFFICE/OUTPT VISIT, EST, LEVL III, 20-29 MIN: ICD-10-PCS | Mod: HCNC,S$GLB,, | Performed by: PODIATRIST

## 2020-12-09 PROCEDURE — 99999 PR PBB SHADOW E&M-EST. PATIENT-LVL III: ICD-10-PCS | Mod: PBBFAC,HCNC,, | Performed by: PODIATRIST

## 2020-12-09 PROCEDURE — 1159F PR MEDICATION LIST DOCUMENTED IN MEDICAL RECORD: ICD-10-PCS | Mod: HCNC,S$GLB,, | Performed by: PODIATRIST

## 2020-12-09 PROCEDURE — 1101F PR PT FALLS ASSESS DOC 0-1 FALLS W/OUT INJ PAST YR: ICD-10-PCS | Mod: HCNC,CPTII,S$GLB, | Performed by: PODIATRIST

## 2020-12-09 PROCEDURE — 3288F PR FALLS RISK ASSESSMENT DOCUMENTED: ICD-10-PCS | Mod: HCNC,CPTII,S$GLB, | Performed by: PODIATRIST

## 2020-12-09 PROCEDURE — 3008F PR BODY MASS INDEX (BMI) DOCUMENTED: ICD-10-PCS | Mod: HCNC,CPTII,S$GLB, | Performed by: PODIATRIST

## 2020-12-09 PROCEDURE — 1126F AMNT PAIN NOTED NONE PRSNT: CPT | Mod: HCNC,S$GLB,, | Performed by: PODIATRIST

## 2020-12-09 PROCEDURE — 1101F PT FALLS ASSESS-DOCD LE1/YR: CPT | Mod: HCNC,CPTII,S$GLB, | Performed by: PODIATRIST

## 2020-12-09 PROCEDURE — 99999 PR PBB SHADOW E&M-EST. PATIENT-LVL III: CPT | Mod: PBBFAC,HCNC,, | Performed by: PODIATRIST

## 2020-12-09 PROCEDURE — 1126F PR PAIN SEVERITY QUANTIFIED, NO PAIN PRESENT: ICD-10-PCS | Mod: HCNC,S$GLB,, | Performed by: PODIATRIST

## 2020-12-09 PROCEDURE — 99213 OFFICE O/P EST LOW 20 MIN: CPT | Mod: HCNC,S$GLB,, | Performed by: PODIATRIST

## 2020-12-09 PROCEDURE — 3008F BODY MASS INDEX DOCD: CPT | Mod: HCNC,CPTII,S$GLB, | Performed by: PODIATRIST

## 2020-12-09 PROCEDURE — 1159F MED LIST DOCD IN RCRD: CPT | Mod: HCNC,S$GLB,, | Performed by: PODIATRIST

## 2020-12-09 PROCEDURE — 3288F FALL RISK ASSESSMENT DOCD: CPT | Mod: HCNC,CPTII,S$GLB, | Performed by: PODIATRIST

## 2020-12-09 NOTE — PROGRESS NOTES
Subjective:      Patient ID: Melo Zuñiga is a 65 y.o. male.    Chief Complaint: Foot Pain (R foot)    Melo is a 65 y.o. male who presents to the podiatry clinic  with complaint of right foot discomfort in the area where he stepped on a nail in the Spring, he relates treating the area with antibiotic ointments and it healed well, he has no pain in the area but feels like something is still there when standing on it. Also related questions about his varicose veins and amlactin versus urea.        Review of Systems   Constitution: Negative for chills and fever.   Cardiovascular: Negative for claudication and leg swelling.   Respiratory: Negative for shortness of breath.    Skin: Negative for itching, nail changes and rash.   Musculoskeletal: Positive for arthritis. Negative for muscle cramps, muscle weakness and myalgias.   Gastrointestinal: Negative for nausea and vomiting.   Neurological: Negative for focal weakness, loss of balance, numbness and paresthesias.           Objective:      Physical Exam  Constitutional:       General: He is not in acute distress.     Appearance: He is well-developed. He is not diaphoretic.   Cardiovascular:      Pulses:           Dorsalis pedis pulses are 2+ on the right side and 2+ on the left side.        Posterior tibial pulses are 2+ on the right side and 2+ on the left side.      Comments: < 3 sec capillary refill time to toes 1-5 bilateral. Toes and feet are warm to touch proximally with normal distal cooling b/l. There is some hair growth on the feet and toes b/l. There is no edema b/l. No spider veins or varicosities present b/l.     Musculoskeletal:      Comments: Bilateral medial arch collapse with weight bearing, eversion of 8 deg with RCSP.    Reducible extensor and flexor contractures at the MTPJ and PIPJ of toes 2-5, bilat.     Right plantar lateral foot where the nail penetrated the area is well healed with a small amount of subcutaneous scar tissue built up in the  area    Equinus noted b/l ankles with < 10 deg DF noted. MMT 5/5 in DF/PF/Inv/Ev resistance with no reproduction of pain in any direction. Passive range of motion of ankle and pedal joints is painless b/l.     Skin:     General: Skin is warm and dry.      Coloration: Skin is not pale.      Findings: No abrasion, bruising, burn, ecchymosis, erythema, laceration, lesion, petechiae or rash.      Nails: There is no clubbing.        Comments: Skin temperature, texture and turgor within normal limits.    Nails 1-5 bilateral are thick 2-3 mm in areas and discolored with subungual debris     Neurological:      Mental Status: He is alert and oriented to person, place, and time.      Sensory: No sensory deficit.      Motor: No tremor, atrophy or abnormal muscle tone.      Comments: Negative tinel sign bilateral.   Psychiatric:         Behavior: Behavior normal.               Assessment:       Encounter Diagnoses   Name Primary?    Pain in scar Yes    Onychomycosis due to dermatophyte          Plan:       Melo was seen today for foot pain.    Diagnoses and all orders for this visit:    Pain in scar    Onychomycosis due to dermatophyte      I counseled the patient on his conditions, their implications and medical management.    Discussed that the feeling in his foot is likely just scar tissue that has formed from the penetration injury, discussed if it gets worse we can inject with steroid declined this for now.    Can get urea 40% found on [employer], or continue using the amlactin as needed    Discussed treatment options for fungal toenails to include topical vs oral vs laser vs combined therapy in detail. Declined treatments for now    Return PHOEBE Leiva DPM

## 2021-01-21 RX ORDER — ATORVASTATIN CALCIUM 10 MG/1
10 TABLET, FILM COATED ORAL EVERY OTHER DAY
Qty: 90 TABLET | Refills: 3 | Status: SHIPPED | OUTPATIENT
Start: 2021-01-21 | End: 2021-03-25 | Stop reason: SDUPTHER

## 2021-01-25 ENCOUNTER — PATIENT OUTREACH (OUTPATIENT)
Dept: ADMINISTRATIVE | Facility: OTHER | Age: 66
End: 2021-01-25

## 2021-01-26 RX ORDER — METOPROLOL TARTRATE 25 MG/1
25 TABLET, FILM COATED ORAL ONCE
Qty: 1 TABLET | Refills: 0 | Status: SHIPPED | OUTPATIENT
Start: 2021-01-26 | End: 2022-02-25

## 2021-01-27 ENCOUNTER — OFFICE VISIT (OUTPATIENT)
Dept: DERMATOLOGY | Facility: CLINIC | Age: 66
End: 2021-01-27
Payer: MEDICARE

## 2021-01-27 VITALS — BODY MASS INDEX: 26.15 KG/M2 | WEIGHT: 198.19 LBS

## 2021-01-27 DIAGNOSIS — L72.0 EPIDERMAL CYST: ICD-10-CM

## 2021-01-27 DIAGNOSIS — L73.9 FOLLICULITIS: ICD-10-CM

## 2021-01-27 DIAGNOSIS — L82.1 SK (SEBORRHEIC KERATOSIS): ICD-10-CM

## 2021-01-27 DIAGNOSIS — D18.01 CHERRY ANGIOMA: ICD-10-CM

## 2021-01-27 DIAGNOSIS — D48.5 NEOPLASM OF UNCERTAIN BEHAVIOR OF SKIN: Primary | ICD-10-CM

## 2021-01-27 DIAGNOSIS — L72.0 MILIA: ICD-10-CM

## 2021-01-27 DIAGNOSIS — D22.9 NEVUS: ICD-10-CM

## 2021-01-27 PROCEDURE — 99999 PR PBB SHADOW E&M-EST. PATIENT-LVL III: CPT | Mod: PBBFAC,,, | Performed by: DERMATOLOGY

## 2021-01-27 PROCEDURE — 88305 TISSUE EXAM BY PATHOLOGIST: CPT | Mod: 26,,, | Performed by: PATHOLOGY

## 2021-01-27 PROCEDURE — 88342 IMHCHEM/IMCYTCHM 1ST ANTB: CPT | Mod: 26,,, | Performed by: PATHOLOGY

## 2021-01-27 PROCEDURE — 3008F BODY MASS INDEX DOCD: CPT | Mod: CPTII,S$GLB,, | Performed by: DERMATOLOGY

## 2021-01-27 PROCEDURE — 99999 PR PBB SHADOW E&M-EST. PATIENT-LVL III: ICD-10-PCS | Mod: PBBFAC,,, | Performed by: DERMATOLOGY

## 2021-01-27 PROCEDURE — 1101F PR PT FALLS ASSESS DOC 0-1 FALLS W/OUT INJ PAST YR: ICD-10-PCS | Mod: CPTII,S$GLB,, | Performed by: DERMATOLOGY

## 2021-01-27 PROCEDURE — 99213 PR OFFICE/OUTPT VISIT, EST, LEVL III, 20-29 MIN: ICD-10-PCS | Mod: 25,S$GLB,, | Performed by: DERMATOLOGY

## 2021-01-27 PROCEDURE — 88305 TISSUE EXAM BY PATHOLOGIST: ICD-10-PCS | Mod: 26,,, | Performed by: PATHOLOGY

## 2021-01-27 PROCEDURE — 3288F FALL RISK ASSESSMENT DOCD: CPT | Mod: CPTII,S$GLB,, | Performed by: DERMATOLOGY

## 2021-01-27 PROCEDURE — 11102 TANGNTL BX SKIN SINGLE LES: CPT | Mod: S$GLB,,, | Performed by: DERMATOLOGY

## 2021-01-27 PROCEDURE — 1159F PR MEDICATION LIST DOCUMENTED IN MEDICAL RECORD: ICD-10-PCS | Mod: S$GLB,,, | Performed by: DERMATOLOGY

## 2021-01-27 PROCEDURE — 1126F PR PAIN SEVERITY QUANTIFIED, NO PAIN PRESENT: ICD-10-PCS | Mod: S$GLB,,, | Performed by: DERMATOLOGY

## 2021-01-27 PROCEDURE — 99213 OFFICE O/P EST LOW 20 MIN: CPT | Mod: 25,S$GLB,, | Performed by: DERMATOLOGY

## 2021-01-27 PROCEDURE — 11102 PR TANGENTIAL BIOPSY, SKIN, SINGLE LESION: ICD-10-PCS | Mod: S$GLB,,, | Performed by: DERMATOLOGY

## 2021-01-27 PROCEDURE — 88342 CHG IMMUNOCYTOCHEMISTRY: ICD-10-PCS | Mod: 26,,, | Performed by: PATHOLOGY

## 2021-01-27 PROCEDURE — 3008F PR BODY MASS INDEX (BMI) DOCUMENTED: ICD-10-PCS | Mod: CPTII,S$GLB,, | Performed by: DERMATOLOGY

## 2021-01-27 PROCEDURE — 88305 TISSUE EXAM BY PATHOLOGIST: CPT | Performed by: PATHOLOGY

## 2021-01-27 PROCEDURE — 1126F AMNT PAIN NOTED NONE PRSNT: CPT | Mod: S$GLB,,, | Performed by: DERMATOLOGY

## 2021-01-27 PROCEDURE — 1159F MED LIST DOCD IN RCRD: CPT | Mod: S$GLB,,, | Performed by: DERMATOLOGY

## 2021-01-27 PROCEDURE — 3288F PR FALLS RISK ASSESSMENT DOCUMENTED: ICD-10-PCS | Mod: CPTII,S$GLB,, | Performed by: DERMATOLOGY

## 2021-01-27 PROCEDURE — 88342 IMHCHEM/IMCYTCHM 1ST ANTB: CPT | Performed by: PATHOLOGY

## 2021-01-27 PROCEDURE — 1101F PT FALLS ASSESS-DOCD LE1/YR: CPT | Mod: CPTII,S$GLB,, | Performed by: DERMATOLOGY

## 2021-01-28 ENCOUNTER — PATIENT MESSAGE (OUTPATIENT)
Dept: FAMILY MEDICINE | Facility: CLINIC | Age: 66
End: 2021-01-28

## 2021-01-29 ENCOUNTER — TELEPHONE (OUTPATIENT)
Dept: FAMILY MEDICINE | Facility: CLINIC | Age: 66
End: 2021-01-29

## 2021-01-31 ENCOUNTER — PATIENT MESSAGE (OUTPATIENT)
Dept: FAMILY MEDICINE | Facility: CLINIC | Age: 66
End: 2021-01-31

## 2021-01-31 ENCOUNTER — EXTERNAL CHRONIC CARE MANAGEMENT (OUTPATIENT)
Dept: PRIMARY CARE CLINIC | Facility: CLINIC | Age: 66
End: 2021-01-31
Payer: MEDICARE

## 2021-01-31 PROCEDURE — 99439 PR CHRONIC CARE MGMT, EA ADDTL 20 MIN: ICD-10-PCS | Mod: S$GLB,,, | Performed by: FAMILY MEDICINE

## 2021-01-31 PROCEDURE — 99490 PR CHRONIC CARE MGMT, 1ST 20 MIN: ICD-10-PCS | Mod: S$GLB,,, | Performed by: FAMILY MEDICINE

## 2021-01-31 PROCEDURE — 99439 CHRNC CARE MGMT STAF EA ADDL: CPT | Mod: S$GLB,,, | Performed by: FAMILY MEDICINE

## 2021-01-31 PROCEDURE — 99490 CHRNC CARE MGMT STAFF 1ST 20: CPT | Mod: S$GLB,,, | Performed by: FAMILY MEDICINE

## 2021-02-01 LAB
FINAL PATHOLOGIC DIAGNOSIS: NORMAL
GROSS: NORMAL
MICROSCOPIC EXAM: NORMAL

## 2021-02-04 ENCOUNTER — PATIENT MESSAGE (OUTPATIENT)
Dept: CARDIOLOGY | Facility: CLINIC | Age: 66
End: 2021-02-04

## 2021-02-12 ENCOUNTER — PATIENT MESSAGE (OUTPATIENT)
Dept: FAMILY MEDICINE | Facility: CLINIC | Age: 66
End: 2021-02-12

## 2021-02-17 ENCOUNTER — PATIENT MESSAGE (OUTPATIENT)
Dept: FAMILY MEDICINE | Facility: CLINIC | Age: 66
End: 2021-02-17

## 2021-02-22 ENCOUNTER — TELEPHONE (OUTPATIENT)
Dept: DERMATOLOGY | Facility: CLINIC | Age: 66
End: 2021-02-22

## 2021-02-28 ENCOUNTER — EXTERNAL CHRONIC CARE MANAGEMENT (OUTPATIENT)
Dept: PRIMARY CARE CLINIC | Facility: CLINIC | Age: 66
End: 2021-02-28
Payer: MEDICARE

## 2021-02-28 PROCEDURE — 99439 CHRNC CARE MGMT STAF EA ADDL: CPT | Mod: S$GLB,,, | Performed by: FAMILY MEDICINE

## 2021-02-28 PROCEDURE — 99490 PR CHRONIC CARE MGMT, 1ST 20 MIN: ICD-10-PCS | Mod: S$GLB,,, | Performed by: FAMILY MEDICINE

## 2021-02-28 PROCEDURE — 99490 CHRNC CARE MGMT STAFF 1ST 20: CPT | Mod: S$GLB,,, | Performed by: FAMILY MEDICINE

## 2021-02-28 PROCEDURE — 99439 PR CHRONIC CARE MGMT, EA ADDTL 20 MIN: ICD-10-PCS | Mod: S$GLB,,, | Performed by: FAMILY MEDICINE

## 2021-03-01 ENCOUNTER — PATIENT MESSAGE (OUTPATIENT)
Dept: PODIATRY | Facility: CLINIC | Age: 66
End: 2021-03-01

## 2021-03-02 ENCOUNTER — TELEPHONE (OUTPATIENT)
Dept: DERMATOLOGY | Facility: CLINIC | Age: 66
End: 2021-03-02

## 2021-03-02 ENCOUNTER — PATIENT MESSAGE (OUTPATIENT)
Dept: PODIATRY | Facility: CLINIC | Age: 66
End: 2021-03-02

## 2021-03-03 ENCOUNTER — PATIENT MESSAGE (OUTPATIENT)
Dept: FAMILY MEDICINE | Facility: CLINIC | Age: 66
End: 2021-03-03

## 2021-03-03 DIAGNOSIS — Z12.11 COLON CANCER SCREENING: Primary | ICD-10-CM

## 2021-03-04 ENCOUNTER — PROCEDURE VISIT (OUTPATIENT)
Dept: DERMATOLOGY | Facility: CLINIC | Age: 66
End: 2021-03-04
Payer: MEDICARE

## 2021-03-04 VITALS
HEART RATE: 73 BPM | DIASTOLIC BLOOD PRESSURE: 65 MMHG | BODY MASS INDEX: 26.24 KG/M2 | WEIGHT: 198 LBS | SYSTOLIC BLOOD PRESSURE: 102 MMHG | HEIGHT: 73 IN

## 2021-03-04 DIAGNOSIS — D22.62: Primary | ICD-10-CM

## 2021-03-04 PROCEDURE — 88342 IMHCHEM/IMCYTCHM 1ST ANTB: CPT | Performed by: DERMATOLOGY

## 2021-03-04 PROCEDURE — 88342 CHG IMMUNOCYTOCHEMISTRY: ICD-10-PCS | Mod: 26,,, | Performed by: DERMATOLOGY

## 2021-03-04 PROCEDURE — 11403 EXC TR-EXT B9+MARG 2.1-3CM: CPT | Mod: 51,S$GLB,, | Performed by: DERMATOLOGY

## 2021-03-04 PROCEDURE — 99499 NO LOS: ICD-10-PCS | Mod: S$GLB,,, | Performed by: DERMATOLOGY

## 2021-03-04 PROCEDURE — 11403 PR EXC SKIN BENIG 2.1-3 CM TRUNK,ARM,LEG: ICD-10-PCS | Mod: 51,S$GLB,, | Performed by: DERMATOLOGY

## 2021-03-04 PROCEDURE — 13121 PR RECMPL WND SCALP,EXTR 2.6-7.5 CM: ICD-10-PCS | Mod: S$GLB,,, | Performed by: DERMATOLOGY

## 2021-03-04 PROCEDURE — 99499 UNLISTED E&M SERVICE: CPT | Mod: S$GLB,,, | Performed by: DERMATOLOGY

## 2021-03-04 PROCEDURE — 88305 TISSUE EXAM BY PATHOLOGIST: CPT | Mod: 26,,, | Performed by: DERMATOLOGY

## 2021-03-04 PROCEDURE — 88342 IMHCHEM/IMCYTCHM 1ST ANTB: CPT | Mod: 26,,, | Performed by: DERMATOLOGY

## 2021-03-04 PROCEDURE — 88305 TISSUE EXAM BY PATHOLOGIST: CPT | Performed by: DERMATOLOGY

## 2021-03-04 PROCEDURE — 88305 TISSUE EXAM BY PATHOLOGIST: ICD-10-PCS | Mod: 26,,, | Performed by: DERMATOLOGY

## 2021-03-04 PROCEDURE — 13121 CMPLX RPR S/A/L 2.6-7.5 CM: CPT | Mod: S$GLB,,, | Performed by: DERMATOLOGY

## 2021-03-05 ENCOUNTER — PATIENT MESSAGE (OUTPATIENT)
Dept: DERMATOLOGY | Facility: CLINIC | Age: 66
End: 2021-03-05

## 2021-03-08 ENCOUNTER — TELEPHONE (OUTPATIENT)
Dept: DERMATOLOGY | Facility: CLINIC | Age: 66
End: 2021-03-08

## 2021-03-10 ENCOUNTER — PATIENT MESSAGE (OUTPATIENT)
Dept: FAMILY MEDICINE | Facility: CLINIC | Age: 66
End: 2021-03-10

## 2021-03-11 ENCOUNTER — TELEPHONE (OUTPATIENT)
Dept: INTERNAL MEDICINE | Facility: CLINIC | Age: 66
End: 2021-03-11

## 2021-03-15 ENCOUNTER — TELEPHONE (OUTPATIENT)
Dept: DERMATOLOGY | Facility: CLINIC | Age: 66
End: 2021-03-15

## 2021-03-17 ENCOUNTER — TELEPHONE (OUTPATIENT)
Dept: DERMATOLOGY | Facility: CLINIC | Age: 66
End: 2021-03-17

## 2021-03-23 ENCOUNTER — PATIENT MESSAGE (OUTPATIENT)
Dept: CARDIOLOGY | Facility: CLINIC | Age: 66
End: 2021-03-23

## 2021-03-23 LAB
COMMENT: NORMAL
FINAL PATHOLOGIC DIAGNOSIS: NORMAL
GROSS: NORMAL
MICROSCOPIC EXAM: NORMAL

## 2021-03-24 RX ORDER — ATORVASTATIN CALCIUM 10 MG/1
10 TABLET, FILM COATED ORAL EVERY OTHER DAY
Qty: 90 TABLET | Refills: 3 | Status: CANCELLED | OUTPATIENT
Start: 2021-03-24

## 2021-03-25 ENCOUNTER — OFFICE VISIT (OUTPATIENT)
Dept: DERMATOLOGY | Facility: CLINIC | Age: 66
End: 2021-03-25
Payer: MEDICARE

## 2021-03-25 DIAGNOSIS — Z09 POSTOP CHECK: Primary | ICD-10-CM

## 2021-03-25 PROCEDURE — 1126F AMNT PAIN NOTED NONE PRSNT: CPT | Mod: S$GLB,,, | Performed by: DERMATOLOGY

## 2021-03-25 PROCEDURE — 99024 PR POST-OP FOLLOW-UP VISIT: ICD-10-PCS | Mod: S$GLB,,, | Performed by: DERMATOLOGY

## 2021-03-25 PROCEDURE — 1101F PR PT FALLS ASSESS DOC 0-1 FALLS W/OUT INJ PAST YR: ICD-10-PCS | Mod: CPTII,S$GLB,, | Performed by: DERMATOLOGY

## 2021-03-25 PROCEDURE — 99999 PR PBB SHADOW E&M-EST. PATIENT-LVL II: CPT | Mod: PBBFAC,,, | Performed by: DERMATOLOGY

## 2021-03-25 PROCEDURE — 1101F PT FALLS ASSESS-DOCD LE1/YR: CPT | Mod: CPTII,S$GLB,, | Performed by: DERMATOLOGY

## 2021-03-25 PROCEDURE — 1126F PR PAIN SEVERITY QUANTIFIED, NO PAIN PRESENT: ICD-10-PCS | Mod: S$GLB,,, | Performed by: DERMATOLOGY

## 2021-03-25 PROCEDURE — 3288F FALL RISK ASSESSMENT DOCD: CPT | Mod: CPTII,S$GLB,, | Performed by: DERMATOLOGY

## 2021-03-25 PROCEDURE — 3288F PR FALLS RISK ASSESSMENT DOCUMENTED: ICD-10-PCS | Mod: CPTII,S$GLB,, | Performed by: DERMATOLOGY

## 2021-03-25 PROCEDURE — 99024 POSTOP FOLLOW-UP VISIT: CPT | Mod: S$GLB,,, | Performed by: DERMATOLOGY

## 2021-03-25 PROCEDURE — 99999 PR PBB SHADOW E&M-EST. PATIENT-LVL II: ICD-10-PCS | Mod: PBBFAC,,, | Performed by: DERMATOLOGY

## 2021-03-25 RX ORDER — TRAZODONE HYDROCHLORIDE 50 MG/1
50 TABLET ORAL NIGHTLY
Qty: 90 TABLET | Refills: 3 | Status: SHIPPED | OUTPATIENT
Start: 2021-03-25 | End: 2022-02-25

## 2021-03-25 RX ORDER — DULOXETIN HYDROCHLORIDE 30 MG/1
30 CAPSULE, DELAYED RELEASE ORAL DAILY
Qty: 30 CAPSULE | Refills: 11 | Status: SHIPPED | OUTPATIENT
Start: 2021-03-25 | End: 2022-02-25

## 2021-03-25 RX ORDER — ATORVASTATIN CALCIUM 10 MG/1
10 TABLET, FILM COATED ORAL EVERY OTHER DAY
Qty: 90 TABLET | Refills: 3 | Status: SHIPPED | OUTPATIENT
Start: 2021-03-25 | End: 2021-03-26

## 2021-03-26 ENCOUNTER — PATIENT MESSAGE (OUTPATIENT)
Dept: CARDIOLOGY | Facility: CLINIC | Age: 66
End: 2021-03-26

## 2021-03-26 RX ORDER — ATORVASTATIN CALCIUM 20 MG/1
10 TABLET, FILM COATED ORAL EVERY OTHER DAY
Qty: 23 TABLET | Refills: 3 | Status: SHIPPED | OUTPATIENT
Start: 2021-03-26 | End: 2022-06-03

## 2021-03-29 ENCOUNTER — PATIENT MESSAGE (OUTPATIENT)
Dept: PODIATRY | Facility: CLINIC | Age: 66
End: 2021-03-29

## 2021-03-31 ENCOUNTER — EXTERNAL CHRONIC CARE MANAGEMENT (OUTPATIENT)
Dept: PRIMARY CARE CLINIC | Facility: CLINIC | Age: 66
End: 2021-03-31
Payer: MEDICARE

## 2021-03-31 PROCEDURE — 99489 PR COMPLX CHRON CARE MGMT, EA ADDTL 30 MIN, PER MONTH: ICD-10-PCS | Mod: S$GLB,,, | Performed by: FAMILY MEDICINE

## 2021-03-31 PROCEDURE — 99489 CPLX CHRNC CARE EA ADDL 30: CPT | Mod: S$GLB,,, | Performed by: FAMILY MEDICINE

## 2021-03-31 PROCEDURE — 99487 PR COMPLX CHRON CARE MGMT, 1ST HR, PER MONTH: ICD-10-PCS | Mod: S$GLB,,, | Performed by: FAMILY MEDICINE

## 2021-03-31 PROCEDURE — 99487 CPLX CHRNC CARE 1ST 60 MIN: CPT | Mod: S$GLB,,, | Performed by: FAMILY MEDICINE

## 2021-04-26 ENCOUNTER — PATIENT MESSAGE (OUTPATIENT)
Dept: FAMILY MEDICINE | Facility: CLINIC | Age: 66
End: 2021-04-26

## 2021-04-30 ENCOUNTER — EXTERNAL CHRONIC CARE MANAGEMENT (OUTPATIENT)
Dept: PRIMARY CARE CLINIC | Facility: CLINIC | Age: 66
End: 2021-04-30
Payer: MEDICARE

## 2021-04-30 PROCEDURE — 99490 CHRNC CARE MGMT STAFF 1ST 20: CPT | Mod: S$GLB,,, | Performed by: FAMILY MEDICINE

## 2021-04-30 PROCEDURE — 99490 PR CHRONIC CARE MGMT, 1ST 20 MIN: ICD-10-PCS | Mod: S$GLB,,, | Performed by: FAMILY MEDICINE

## 2021-05-06 ENCOUNTER — PATIENT MESSAGE (OUTPATIENT)
Dept: RESEARCH | Facility: HOSPITAL | Age: 66
End: 2021-05-06

## 2021-05-31 ENCOUNTER — EXTERNAL CHRONIC CARE MANAGEMENT (OUTPATIENT)
Dept: PRIMARY CARE CLINIC | Facility: CLINIC | Age: 66
End: 2021-05-31
Payer: MEDICARE

## 2021-05-31 PROCEDURE — 99490 PR CHRONIC CARE MGMT, 1ST 20 MIN: ICD-10-PCS | Mod: S$GLB,,, | Performed by: FAMILY MEDICINE

## 2021-05-31 PROCEDURE — 99490 CHRNC CARE MGMT STAFF 1ST 20: CPT | Mod: S$GLB,,, | Performed by: FAMILY MEDICINE

## 2021-06-03 ENCOUNTER — PES CALL (OUTPATIENT)
Dept: ADMINISTRATIVE | Facility: CLINIC | Age: 66
End: 2021-06-03

## 2021-06-09 ENCOUNTER — LAB VISIT (OUTPATIENT)
Dept: LAB | Facility: HOSPITAL | Age: 66
End: 2021-06-09
Attending: FAMILY MEDICINE
Payer: MEDICARE

## 2021-06-09 DIAGNOSIS — Z12.11 COLON CANCER SCREENING: ICD-10-CM

## 2021-06-09 PROCEDURE — 82274 ASSAY TEST FOR BLOOD FECAL: CPT | Performed by: FAMILY MEDICINE

## 2021-06-15 ENCOUNTER — TELEPHONE (OUTPATIENT)
Dept: FAMILY MEDICINE | Facility: CLINIC | Age: 66
End: 2021-06-15

## 2021-06-15 LAB — HEMOCCULT STL QL IA: NEGATIVE

## 2021-06-30 ENCOUNTER — EXTERNAL CHRONIC CARE MANAGEMENT (OUTPATIENT)
Dept: PRIMARY CARE CLINIC | Facility: CLINIC | Age: 66
End: 2021-06-30
Payer: MEDICARE

## 2021-06-30 PROCEDURE — 99490 PR CHRONIC CARE MGMT, 1ST 20 MIN: ICD-10-PCS | Mod: S$GLB,,, | Performed by: FAMILY MEDICINE

## 2021-06-30 PROCEDURE — 99490 CHRNC CARE MGMT STAFF 1ST 20: CPT | Mod: S$GLB,,, | Performed by: FAMILY MEDICINE

## 2021-07-31 ENCOUNTER — EXTERNAL CHRONIC CARE MANAGEMENT (OUTPATIENT)
Dept: PRIMARY CARE CLINIC | Facility: CLINIC | Age: 66
End: 2021-07-31
Payer: MEDICARE

## 2021-07-31 PROCEDURE — 99490 PR CHRONIC CARE MGMT, 1ST 20 MIN: ICD-10-PCS | Mod: S$GLB,,, | Performed by: FAMILY MEDICINE

## 2021-07-31 PROCEDURE — 99490 CHRNC CARE MGMT STAFF 1ST 20: CPT | Mod: S$GLB,,, | Performed by: FAMILY MEDICINE

## 2021-08-23 ENCOUNTER — TELEPHONE (OUTPATIENT)
Dept: OTOLARYNGOLOGY | Facility: CLINIC | Age: 66
End: 2021-08-23

## 2021-08-31 ENCOUNTER — EXTERNAL CHRONIC CARE MANAGEMENT (OUTPATIENT)
Dept: PRIMARY CARE CLINIC | Facility: CLINIC | Age: 66
End: 2021-08-31
Payer: MEDICARE

## 2021-08-31 PROCEDURE — 99490 CHRNC CARE MGMT STAFF 1ST 20: CPT | Mod: S$GLB,,, | Performed by: FAMILY MEDICINE

## 2021-08-31 PROCEDURE — 99490 PR CHRONIC CARE MGMT, 1ST 20 MIN: ICD-10-PCS | Mod: S$GLB,,, | Performed by: FAMILY MEDICINE

## 2021-09-10 ENCOUNTER — OFFICE VISIT (OUTPATIENT)
Dept: OTOLARYNGOLOGY | Facility: CLINIC | Age: 66
End: 2021-09-10
Payer: MEDICARE

## 2021-09-10 VITALS — HEIGHT: 73 IN | BODY MASS INDEX: 26.24 KG/M2 | WEIGHT: 198 LBS

## 2021-09-10 DIAGNOSIS — R07.0 THROAT DISCOMFORT: Primary | ICD-10-CM

## 2021-09-10 PROCEDURE — 1159F PR MEDICATION LIST DOCUMENTED IN MEDICAL RECORD: ICD-10-PCS | Mod: CPTII,S$GLB,, | Performed by: NURSE PRACTITIONER

## 2021-09-10 PROCEDURE — 3008F PR BODY MASS INDEX (BMI) DOCUMENTED: ICD-10-PCS | Mod: CPTII,S$GLB,, | Performed by: NURSE PRACTITIONER

## 2021-09-10 PROCEDURE — 99499 RISK ADDL DX/OHS AUDIT: ICD-10-PCS | Mod: S$GLB,,, | Performed by: NURSE PRACTITIONER

## 2021-09-10 PROCEDURE — 99999 PR PBB SHADOW E&M-EST. PATIENT-LVL III: CPT | Mod: PBBFAC,,, | Performed by: NURSE PRACTITIONER

## 2021-09-10 PROCEDURE — 99203 OFFICE O/P NEW LOW 30 MIN: CPT | Mod: 25,S$GLB,, | Performed by: NURSE PRACTITIONER

## 2021-09-10 PROCEDURE — 31575 DIAGNOSTIC LARYNGOSCOPY: CPT | Mod: S$GLB,,, | Performed by: NURSE PRACTITIONER

## 2021-09-10 PROCEDURE — 3288F FALL RISK ASSESSMENT DOCD: CPT | Mod: CPTII,S$GLB,, | Performed by: NURSE PRACTITIONER

## 2021-09-10 PROCEDURE — 1160F RVW MEDS BY RX/DR IN RCRD: CPT | Mod: CPTII,S$GLB,, | Performed by: NURSE PRACTITIONER

## 2021-09-10 PROCEDURE — 3288F PR FALLS RISK ASSESSMENT DOCUMENTED: ICD-10-PCS | Mod: CPTII,S$GLB,, | Performed by: NURSE PRACTITIONER

## 2021-09-10 PROCEDURE — 1126F AMNT PAIN NOTED NONE PRSNT: CPT | Mod: CPTII,S$GLB,, | Performed by: NURSE PRACTITIONER

## 2021-09-10 PROCEDURE — 3008F BODY MASS INDEX DOCD: CPT | Mod: CPTII,S$GLB,, | Performed by: NURSE PRACTITIONER

## 2021-09-10 PROCEDURE — 1159F MED LIST DOCD IN RCRD: CPT | Mod: CPTII,S$GLB,, | Performed by: NURSE PRACTITIONER

## 2021-09-10 PROCEDURE — 1101F PR PT FALLS ASSESS DOC 0-1 FALLS W/OUT INJ PAST YR: ICD-10-PCS | Mod: CPTII,S$GLB,, | Performed by: NURSE PRACTITIONER

## 2021-09-10 PROCEDURE — 99499 UNLISTED E&M SERVICE: CPT | Mod: S$GLB,,, | Performed by: NURSE PRACTITIONER

## 2021-09-10 PROCEDURE — 1160F PR REVIEW ALL MEDS BY PRESCRIBER/CLIN PHARMACIST DOCUMENTED: ICD-10-PCS | Mod: CPTII,S$GLB,, | Performed by: NURSE PRACTITIONER

## 2021-09-10 PROCEDURE — 99999 PR PBB SHADOW E&M-EST. PATIENT-LVL III: ICD-10-PCS | Mod: PBBFAC,,, | Performed by: NURSE PRACTITIONER

## 2021-09-10 PROCEDURE — 99203 PR OFFICE/OUTPT VISIT, NEW, LEVL III, 30-44 MIN: ICD-10-PCS | Mod: 25,S$GLB,, | Performed by: NURSE PRACTITIONER

## 2021-09-10 PROCEDURE — 1126F PR PAIN SEVERITY QUANTIFIED, NO PAIN PRESENT: ICD-10-PCS | Mod: CPTII,S$GLB,, | Performed by: NURSE PRACTITIONER

## 2021-09-10 PROCEDURE — 31575 PR LARYNGOSCOPY, FLEXIBLE; DIAGNOSTIC: ICD-10-PCS | Mod: S$GLB,,, | Performed by: NURSE PRACTITIONER

## 2021-09-10 PROCEDURE — 1101F PT FALLS ASSESS-DOCD LE1/YR: CPT | Mod: CPTII,S$GLB,, | Performed by: NURSE PRACTITIONER

## 2021-09-30 ENCOUNTER — EXTERNAL CHRONIC CARE MANAGEMENT (OUTPATIENT)
Dept: PRIMARY CARE CLINIC | Facility: CLINIC | Age: 66
End: 2021-09-30
Payer: MEDICARE

## 2021-09-30 PROCEDURE — 99490 PR CHRONIC CARE MGMT, 1ST 20 MIN: ICD-10-PCS | Mod: S$GLB,,, | Performed by: FAMILY MEDICINE

## 2021-09-30 PROCEDURE — 99490 CHRNC CARE MGMT STAFF 1ST 20: CPT | Mod: S$GLB,,, | Performed by: FAMILY MEDICINE

## 2021-10-04 ENCOUNTER — PATIENT MESSAGE (OUTPATIENT)
Dept: ADMINISTRATIVE | Facility: HOSPITAL | Age: 66
End: 2021-10-04

## 2021-10-19 ENCOUNTER — PATIENT MESSAGE (OUTPATIENT)
Dept: CARDIOLOGY | Facility: CLINIC | Age: 66
End: 2021-10-19
Payer: MEDICAID

## 2021-10-31 ENCOUNTER — EXTERNAL CHRONIC CARE MANAGEMENT (OUTPATIENT)
Dept: PRIMARY CARE CLINIC | Facility: CLINIC | Age: 66
End: 2021-10-31
Payer: MEDICARE

## 2021-10-31 PROCEDURE — 99490 PR CHRONIC CARE MGMT, 1ST 20 MIN: ICD-10-PCS | Mod: S$GLB,,, | Performed by: FAMILY MEDICINE

## 2021-10-31 PROCEDURE — 99490 CHRNC CARE MGMT STAFF 1ST 20: CPT | Mod: S$GLB,,, | Performed by: FAMILY MEDICINE

## 2021-11-30 ENCOUNTER — EXTERNAL CHRONIC CARE MANAGEMENT (OUTPATIENT)
Dept: PRIMARY CARE CLINIC | Facility: CLINIC | Age: 66
End: 2021-11-30
Payer: MEDICARE

## 2021-11-30 PROCEDURE — 99490 CHRNC CARE MGMT STAFF 1ST 20: CPT | Mod: S$GLB,,, | Performed by: FAMILY MEDICINE

## 2021-11-30 PROCEDURE — 99490 PR CHRONIC CARE MGMT, 1ST 20 MIN: ICD-10-PCS | Mod: S$GLB,,, | Performed by: FAMILY MEDICINE

## 2022-01-10 ENCOUNTER — PATIENT MESSAGE (OUTPATIENT)
Dept: ADMINISTRATIVE | Facility: HOSPITAL | Age: 67
End: 2022-01-10
Payer: MEDICAID

## 2022-01-28 ENCOUNTER — PATIENT MESSAGE (OUTPATIENT)
Dept: PODIATRY | Facility: CLINIC | Age: 67
End: 2022-01-28
Payer: MEDICAID

## 2022-01-31 NOTE — TELEPHONE ENCOUNTER
Spoke with the patient and informed that he would need to contact his insurance company to obtain information regarding covering the cost of custom inserts

## 2022-02-18 ENCOUNTER — PATIENT MESSAGE (OUTPATIENT)
Dept: FAMILY MEDICINE | Facility: CLINIC | Age: 67
End: 2022-02-18
Payer: MEDICAID

## 2022-02-24 ENCOUNTER — PATIENT MESSAGE (OUTPATIENT)
Dept: FAMILY MEDICINE | Facility: CLINIC | Age: 67
End: 2022-02-24
Payer: MEDICAID

## 2022-02-25 ENCOUNTER — OFFICE VISIT (OUTPATIENT)
Dept: FAMILY MEDICINE | Facility: CLINIC | Age: 67
End: 2022-02-25
Payer: COMMERCIAL

## 2022-02-25 VITALS
TEMPERATURE: 98 F | SYSTOLIC BLOOD PRESSURE: 122 MMHG | OXYGEN SATURATION: 99 % | HEART RATE: 75 BPM | DIASTOLIC BLOOD PRESSURE: 74 MMHG | WEIGHT: 198.44 LBS | BODY MASS INDEX: 26.18 KG/M2

## 2022-02-25 DIAGNOSIS — Z00.00 ROUTINE GENERAL MEDICAL EXAMINATION AT A HEALTH CARE FACILITY: Primary | ICD-10-CM

## 2022-02-25 DIAGNOSIS — Z12.11 COLON CANCER SCREENING: ICD-10-CM

## 2022-02-25 DIAGNOSIS — F32.5 MDD (MAJOR DEPRESSIVE DISORDER), SINGLE EPISODE, IN FULL REMISSION: ICD-10-CM

## 2022-02-25 DIAGNOSIS — E78.5 HYPERLIPIDEMIA, UNSPECIFIED HYPERLIPIDEMIA TYPE: ICD-10-CM

## 2022-02-25 DIAGNOSIS — Z12.5 SCREENING FOR MALIGNANT NEOPLASM OF PROSTATE: ICD-10-CM

## 2022-02-25 PROCEDURE — 1159F MED LIST DOCD IN RCRD: CPT | Mod: HCNC,CPTII,S$GLB, | Performed by: FAMILY MEDICINE

## 2022-02-25 PROCEDURE — 99499 RISK ADDL DX/OHS AUDIT: ICD-10-PCS | Mod: S$GLB,,, | Performed by: FAMILY MEDICINE

## 2022-02-25 PROCEDURE — 3078F PR MOST RECENT DIASTOLIC BLOOD PRESSURE < 80 MM HG: ICD-10-PCS | Mod: HCNC,CPTII,S$GLB, | Performed by: FAMILY MEDICINE

## 2022-02-25 PROCEDURE — 3008F BODY MASS INDEX DOCD: CPT | Mod: HCNC,CPTII,S$GLB, | Performed by: FAMILY MEDICINE

## 2022-02-25 PROCEDURE — 99999 PR PBB SHADOW E&M-EST. PATIENT-LVL III: ICD-10-PCS | Mod: PBBFAC,HCNC,, | Performed by: FAMILY MEDICINE

## 2022-02-25 PROCEDURE — 3078F DIAST BP <80 MM HG: CPT | Mod: HCNC,CPTII,S$GLB, | Performed by: FAMILY MEDICINE

## 2022-02-25 PROCEDURE — 99397 PR PREVENTIVE VISIT,EST,65 & OVER: ICD-10-PCS | Mod: HCNC,S$GLB,, | Performed by: FAMILY MEDICINE

## 2022-02-25 PROCEDURE — 1159F PR MEDICATION LIST DOCUMENTED IN MEDICAL RECORD: ICD-10-PCS | Mod: HCNC,CPTII,S$GLB, | Performed by: FAMILY MEDICINE

## 2022-02-25 PROCEDURE — 3288F FALL RISK ASSESSMENT DOCD: CPT | Mod: HCNC,CPTII,S$GLB, | Performed by: FAMILY MEDICINE

## 2022-02-25 PROCEDURE — 3074F PR MOST RECENT SYSTOLIC BLOOD PRESSURE < 130 MM HG: ICD-10-PCS | Mod: HCNC,CPTII,S$GLB, | Performed by: FAMILY MEDICINE

## 2022-02-25 PROCEDURE — 99397 PER PM REEVAL EST PAT 65+ YR: CPT | Mod: HCNC,S$GLB,, | Performed by: FAMILY MEDICINE

## 2022-02-25 PROCEDURE — 3074F SYST BP LT 130 MM HG: CPT | Mod: HCNC,CPTII,S$GLB, | Performed by: FAMILY MEDICINE

## 2022-02-25 PROCEDURE — 99213 OFFICE O/P EST LOW 20 MIN: CPT | Mod: PBBFAC,PO | Performed by: FAMILY MEDICINE

## 2022-02-25 PROCEDURE — 3008F PR BODY MASS INDEX (BMI) DOCUMENTED: ICD-10-PCS | Mod: HCNC,CPTII,S$GLB, | Performed by: FAMILY MEDICINE

## 2022-02-25 PROCEDURE — 1101F PT FALLS ASSESS-DOCD LE1/YR: CPT | Mod: HCNC,CPTII,S$GLB, | Performed by: FAMILY MEDICINE

## 2022-02-25 PROCEDURE — 3288F PR FALLS RISK ASSESSMENT DOCUMENTED: ICD-10-PCS | Mod: HCNC,CPTII,S$GLB, | Performed by: FAMILY MEDICINE

## 2022-02-25 PROCEDURE — 1101F PR PT FALLS ASSESS DOC 0-1 FALLS W/OUT INJ PAST YR: ICD-10-PCS | Mod: HCNC,CPTII,S$GLB, | Performed by: FAMILY MEDICINE

## 2022-02-25 PROCEDURE — 99999 PR PBB SHADOW E&M-EST. PATIENT-LVL III: CPT | Mod: PBBFAC,HCNC,, | Performed by: FAMILY MEDICINE

## 2022-02-25 PROCEDURE — 99499 UNLISTED E&M SERVICE: CPT | Mod: S$GLB,,, | Performed by: FAMILY MEDICINE

## 2022-02-25 PROCEDURE — 1125F PR PAIN SEVERITY QUANTIFIED, PAIN PRESENT: ICD-10-PCS | Mod: HCNC,CPTII,S$GLB, | Performed by: FAMILY MEDICINE

## 2022-02-25 PROCEDURE — 1125F AMNT PAIN NOTED PAIN PRSNT: CPT | Mod: HCNC,CPTII,S$GLB, | Performed by: FAMILY MEDICINE

## 2022-02-25 NOTE — PROGRESS NOTES
(Portions of this note were dictated using voice recognition software and may contain dictation related errors in spelling/grammar/syntax not found on text review)    CC:   Chief Complaint   Patient presents with    Annual Exam       HPI: 66 y.o. male     Last visit August 2020    Anxiety/depression/insomnia.  Prior was on Cymbalta and trazodone.  Currently not taking these.  Initially a lot of the mood issues seem to be very centrally associated with poor sleep.  Sleep is doing much better now for the most part, takes trazodone occasionally since he just had knee surgery and sometimes pain keeps him up.  However, his mood is markedly improved.  Off of his Cymbalta.      Other  prior meds tried:  Hydroxyzine, made very groggy but did not quite help with sleep  Melatonin incompletely effective for sleep controlled  Lexapro:  ED  Zoloft:  ED  Wellbutrin:  Unsure effect profile      Dyslipidemia:  On Lipitor 10 mg daily     Elevated left hemidiaphragm, has chronic shortness of breath.  Had seen pulmonology in 2018.  6 min walk showed moderate dyspnea but no desaturation.  PSG showed sleep apnea and recommended CPAP but he could not tolerate.    Had outside methacholine challenge test which was nondiagnostic for asthma although did not preclude considering trial of beta agonist therapy.    Also has prior workup for chest pain symptoms had done a nuclear stress which showed large fixed perfusion defect inferolateral aspect normal ejection fraction.  Had subsequently seen cardiology.  There was some question whether some of this was artifact from his elevated hemidiaphragm, CTA showed low calcium score very minimal amount of plaque but no other significant coronary artery disease.     History of torn meniscus , has followed with outside orthopedics Dr. Powell.  Had right knee arthroscopy with partial meniscectomy. outside Records reviewed, last notes from February 3rd.  Currently in therapy, progressing but it is painful  as expected.  Right knee will swell expectedly.  Sometimes finds like he gets nauseated during therapy, not sure if it is just related to pain level        Answers for HPI/ROS submitted by the patient on 2/22/2022  activity change: Yes  unexpected weight change: No  neck pain: No  hearing loss: No  rhinorrhea: No  trouble swallowing: No  eye discharge: No  visual disturbance: No  chest tightness: No  wheezing: No  chest pain: No  palpitations: No  blood in stool: No  constipation: No  vomiting: No  diarrhea: No  polydipsia: No  polyuria: No  difficulty urinating: No  urgency: No  hematuria: No  joint swelling: No  arthralgias: No  headaches: No  weakness: No  confusion: No  dysphoric mood: No        Past Medical History:   Diagnosis Date    Elevated hemidiaphragm     HLD (hyperlipidemia)     Meniscal injury        Past Surgical History:   Procedure Laterality Date    COLONOSCOPY      NO PAST SURGERIES         Family History   Problem Relation Age of Onset    Skin cancer Father     Diabetes Neg Hx     Cancer Neg Hx     Coronary artery disease Neg Hx     Melanoma Neg Hx        Social History     Socioeconomic History    Marital status: Single   Tobacco Use    Smoking status: Never Smoker    Smokeless tobacco: Never Used   Substance and Sexual Activity    Alcohol use: No    Drug use: No    Sexual activity: Yes     Partners: Female     Social Determinants of Health     Financial Resource Strain: Low Risk     Difficulty of Paying Living Expenses: Not hard at all   Food Insecurity: No Food Insecurity    Worried About Running Out of Food in the Last Year: Never true    Ran Out of Food in the Last Year: Never true   Transportation Needs: No Transportation Needs    Lack of Transportation (Medical): No    Lack of Transportation (Non-Medical): No   Physical Activity: Insufficiently Active    Days of Exercise per Week: 2 days    Minutes of Exercise per Session: 30 min   Stress: No Stress Concern Present     Feeling of Stress : Only a little   Social Connections: Unknown    Frequency of Communication with Friends and Family: More than three times a week    Frequency of Social Gatherings with Friends and Family: Three times a week    Active Member of Clubs or Organizations: Yes    Attends Club or Organization Meetings: More than 4 times per year    Marital Status:    Housing Stability: Low Risk     Unable to Pay for Housing in the Last Year: No    Number of Places Lived in the Last Year: 1    Unstable Housing in the Last Year: No       Lab Results   Component Value Date    WBC 5.70 08/31/2020    HGB 14.7 08/31/2020    HCT 46.9 08/31/2020    MCV 95 08/31/2020     08/31/2020    CHOL 208 (H) 08/31/2020    TRIG 106 08/31/2020    HDL 46 08/31/2020    ALT 20 08/31/2020    AST 19 08/31/2020    BILITOT 0.5 08/31/2020    ALKPHOS 66 08/31/2020     08/31/2020    K 4.3 08/31/2020     08/31/2020    CREATININE 1.3 08/31/2020    ESTGFRAFRICA >60.0 08/31/2020    EGFRNONAA 56.9 (A) 08/31/2020    CALCIUM 9.3 08/31/2020    ALBUMIN 3.9 08/31/2020    BUN 17 08/31/2020    CO2 24 08/31/2020    TSH 2.196 08/31/2020    PSA 2.7 02/27/2018    LDLCALC 140.8 08/31/2020    GLU 88 08/31/2020                         Vital signs reviewed  There were no vitals filed for this visit.    Wt Readings from Last 4 Encounters:   09/10/21 89.8 kg (197 lb 15.6 oz)   03/04/21 89.8 kg (198 lb)   01/27/21 89.9 kg (198 lb 3.1 oz)   12/09/20 89.4 kg (197 lb 1.5 oz)       PE:   APPEARANCE: Well nourished, well developed, in no acute distress.    HEAD: Normocephalic, atraumatic.  EYES: PERRL. EOMI.   Conjunctivae noninjected.  EARS: TM's intact. Light reflex normal. No retraction or perforation.    NOSE: Mucosa pink. Airway clear.  MOUTH & THROAT: No tonsillar enlargement. No pharyngeal erythema or exudate.   NECK: Supple with no cervical lymphadenopathy.  No carotid bruits.  No thyromegaly  CHEST: Good inspiratory effort. Lungs  clear to auscultation with no wheezes or crackles.  CARDIOVASCULAR: Normal S1, S2. No rubs, murmurs, or gallops.  ABDOMEN: Bowel sounds normal. Not distended. Soft. No tenderness or masses. No organomegaly.  EXTREMITIES:  Does have some knee effusion on the right side versus left, otherwise healing from surgery well      IMPRESSION  1. Routine general medical examination at a health care facility    2. Colon cancer screening    3. Hyperlipidemia, unspecified hyperlipidemia type    4. Screening for malignant neoplasm of prostate    5. MDD (major depressive disorder), single episode, in full remission        PLAN  Orders Placed This Encounter   Procedures    Fecal Immunochemical Test (iFOBT)    PSA, Screening    TSH    Lipid Panel    Comprehensive Metabolic Panel    CBC Auto Differential     Depression:  Full Remission, occasional trazodone for insomnia    Continue Lipitor for hyperlipidemia.  Does admit that he does not take every day    Advised on continuing physical therapy.  Once he is progressing sufficiently with therapy, would advise to try working in some regular exercise to help with his chronic dyspnea secondary to his elevated left hemidiaphragm.  Reviewed his most recent cardiology studies.       SCREENINGS  Colonoscopy was due back 2021 but opted for fit test which was negative on 06/15/2021.  Would like another fit kit this year, provided    Prostate: sees urology, PSA ordered     Immunizations  Tdap 2016  Pneumococcal vaccine due although counseling provided today but patient decline  COVID 04/20/2021, 07/21/2021, advise on booster  Flu:

## 2022-02-25 NOTE — PROGRESS NOTES
[employer] was given to patient on 2/25/2022 9:15 AM           Answers for HPI/ROS submitted by the patient on 2/22/2022  activity change: Yes  unexpected weight change: No  neck pain: No  hearing loss: No  rhinorrhea: No  trouble swallowing: No  eye discharge: No  visual disturbance: No  chest tightness: No  wheezing: No  chest pain: No  palpitations: No  blood in stool: No  constipation: No  vomiting: No  diarrhea: No  polydipsia: No  polyuria: No  difficulty urinating: No  urgency: No  hematuria: No  joint swelling: No  arthralgias: No  headaches: No  weakness: No  confusion: No  dysphoric mood: No

## 2022-06-23 ENCOUNTER — PATIENT OUTREACH (OUTPATIENT)
Dept: ADMINISTRATIVE | Facility: HOSPITAL | Age: 67
End: 2022-06-23
Payer: MEDICAID

## 2022-06-23 NOTE — PROGRESS NOTES
Non-compliant [facility] report chart review - Chart review completed for the following HM test if overdue  (Mammogram, Colonoscopy, Cervical Cancer Screening,  Diabetic lab testing, and/or Dilated EYE EXAM)  06/23/2022    Care Everywhere and Media reports - updates requested and reviewed.             Health Maintenance Due   Topic Date Due    Shingles Vaccine (1 of 2) Never done    Pneumococcal Vaccines (Age 65+) (1 - PCV) Never done    COVID-19 Vaccine (3 - Booster for Moderna series) 12/21/2021    Colorectal Cancer Screening  06/15/2022

## 2022-06-28 ENCOUNTER — PATIENT OUTREACH (OUTPATIENT)
Dept: ADMINISTRATIVE | Facility: HOSPITAL | Age: 67
End: 2022-06-28
Payer: MEDICAID

## 2022-09-17 LAB — HEMOCCULT STL QL IA: NEGATIVE

## 2022-11-17 ENCOUNTER — PATIENT OUTREACH (OUTPATIENT)
Dept: ADMINISTRATIVE | Facility: HOSPITAL | Age: 67
End: 2022-11-17
Payer: MEDICAID

## 2022-11-17 NOTE — PROGRESS NOTES
Care Everywhere updates requested and reviewed.  Immunizations reconciled. Media reports reviewed.  Duplicate HM overrides and  orders removed.  Overdue HM topic chart audit and/or requested.  Overdue lab testing linked to upcoming lab appointments if applies.    HM updated with external [employer] report.       Health Maintenance Due   Topic Date Due    Shingles Vaccine (1 of 2) Never done    Pneumococcal Vaccines (Age 65+) (1 - PCV) Never done    COVID-19 Vaccine (3 - Booster for Moderna series) 09/15/2021    Colorectal Cancer Screening  06/15/2022    Influenza Vaccine (1) Never done

## 2022-12-10 ENCOUNTER — PATIENT MESSAGE (OUTPATIENT)
Dept: FAMILY MEDICINE | Facility: CLINIC | Age: 67
End: 2022-12-10
Payer: MEDICAID

## 2022-12-31 LAB
ALBUMIN SERPL-MCNC: 4.2 G/DL (ref 3.6–5.1)
ALBUMIN/GLOB SERPL: 1.4 (CALC) (ref 1–2.5)
ALP SERPL-CCNC: 64 U/L (ref 35–144)
ALT SERPL-CCNC: 16 U/L (ref 9–46)
AST SERPL-CCNC: 17 U/L (ref 10–35)
BASOPHILS # BLD AUTO: 50 CELLS/UL (ref 0–200)
BASOPHILS NFR BLD AUTO: 0.9 %
BILIRUB SERPL-MCNC: 0.5 MG/DL (ref 0.2–1.2)
BUN SERPL-MCNC: 17 MG/DL (ref 7–25)
BUN/CREAT SERPL: NORMAL (CALC) (ref 6–22)
CALCIUM SERPL-MCNC: 9 MG/DL (ref 8.6–10.3)
CHLORIDE SERPL-SCNC: 104 MMOL/L (ref 98–110)
CHOLEST SERPL-MCNC: 207 MG/DL
CHOLEST/HDLC SERPL: 3.8 (CALC)
CO2 SERPL-SCNC: 27 MMOL/L (ref 20–32)
CREAT SERPL-MCNC: 1.28 MG/DL (ref 0.7–1.35)
EGFR: 61 ML/MIN/1.73M2
EOSINOPHIL # BLD AUTO: 392 CELLS/UL (ref 15–500)
EOSINOPHIL NFR BLD AUTO: 7 %
ERYTHROCYTE [DISTWIDTH] IN BLOOD BY AUTOMATED COUNT: 14.8 % (ref 11–15)
GLOBULIN SER CALC-MCNC: 3 G/DL (CALC) (ref 1.9–3.7)
GLUCOSE SERPL-MCNC: 87 MG/DL (ref 65–99)
HCT VFR BLD AUTO: 43.9 % (ref 38.5–50)
HDLC SERPL-MCNC: 55 MG/DL
HGB BLD-MCNC: 14.5 G/DL (ref 13.2–17.1)
LDLC SERPL CALC-MCNC: 130 MG/DL (CALC)
LYMPHOCYTES # BLD AUTO: 1646 CELLS/UL (ref 850–3900)
LYMPHOCYTES NFR BLD AUTO: 29.4 %
MCH RBC QN AUTO: 28.5 PG (ref 27–33)
MCHC RBC AUTO-ENTMCNC: 33 G/DL (ref 32–36)
MCV RBC AUTO: 86.2 FL (ref 80–100)
MONOCYTES # BLD AUTO: 588 CELLS/UL (ref 200–950)
MONOCYTES NFR BLD AUTO: 10.5 %
NEUTROPHILS # BLD AUTO: 2923 CELLS/UL (ref 1500–7800)
NEUTROPHILS NFR BLD AUTO: 52.2 %
NONHDLC SERPL-MCNC: 152 MG/DL (CALC)
PLATELET # BLD AUTO: 177 THOUSAND/UL (ref 140–400)
PMV BLD REES-ECKER: 11.4 FL (ref 7.5–12.5)
POTASSIUM SERPL-SCNC: 4.1 MMOL/L (ref 3.5–5.3)
PROT SERPL-MCNC: 7.2 G/DL (ref 6.1–8.1)
PSA SERPL-MCNC: 2.12 NG/ML
RBC # BLD AUTO: 5.09 MILLION/UL (ref 4.2–5.8)
SODIUM SERPL-SCNC: 139 MMOL/L (ref 135–146)
TRIGL SERPL-MCNC: 111 MG/DL
TSH SERPL-ACNC: 3.33 MIU/L (ref 0.4–4.5)
WBC # BLD AUTO: 5.6 THOUSAND/UL (ref 3.8–10.8)

## 2023-01-26 ENCOUNTER — PATIENT MESSAGE (OUTPATIENT)
Dept: FAMILY MEDICINE | Facility: CLINIC | Age: 68
End: 2023-01-26
Payer: MEDICAID

## 2023-01-27 ENCOUNTER — OFFICE VISIT (OUTPATIENT)
Dept: FAMILY MEDICINE | Facility: CLINIC | Age: 68
End: 2023-01-27
Payer: COMMERCIAL

## 2023-01-27 VITALS
BODY MASS INDEX: 26.21 KG/M2 | HEART RATE: 64 BPM | TEMPERATURE: 98 F | HEIGHT: 73 IN | SYSTOLIC BLOOD PRESSURE: 118 MMHG | OXYGEN SATURATION: 98 % | WEIGHT: 197.75 LBS | DIASTOLIC BLOOD PRESSURE: 76 MMHG

## 2023-01-27 DIAGNOSIS — Z00.00 ROUTINE GENERAL MEDICAL EXAMINATION AT A HEALTH CARE FACILITY: Primary | ICD-10-CM

## 2023-01-27 DIAGNOSIS — E78.5 HYPERLIPIDEMIA, UNSPECIFIED HYPERLIPIDEMIA TYPE: ICD-10-CM

## 2023-01-27 DIAGNOSIS — J98.6 ELEVATED HEMIDIAPHRAGM: ICD-10-CM

## 2023-01-27 PROCEDURE — 1159F MED LIST DOCD IN RCRD: CPT | Mod: HCNC,CPTII,S$GLB, | Performed by: FAMILY MEDICINE

## 2023-01-27 PROCEDURE — 99999 PR PBB SHADOW E&M-EST. PATIENT-LVL III: ICD-10-PCS | Mod: PBBFAC,HCNC,, | Performed by: FAMILY MEDICINE

## 2023-01-27 PROCEDURE — 1125F AMNT PAIN NOTED PAIN PRSNT: CPT | Mod: HCNC,CPTII,S$GLB, | Performed by: FAMILY MEDICINE

## 2023-01-27 PROCEDURE — 1159F PR MEDICATION LIST DOCUMENTED IN MEDICAL RECORD: ICD-10-PCS | Mod: HCNC,CPTII,S$GLB, | Performed by: FAMILY MEDICINE

## 2023-01-27 PROCEDURE — 1125F PR PAIN SEVERITY QUANTIFIED, PAIN PRESENT: ICD-10-PCS | Mod: HCNC,CPTII,S$GLB, | Performed by: FAMILY MEDICINE

## 2023-01-27 PROCEDURE — 3074F SYST BP LT 130 MM HG: CPT | Mod: HCNC,CPTII,S$GLB, | Performed by: FAMILY MEDICINE

## 2023-01-27 PROCEDURE — 99397 PR PREVENTIVE VISIT,EST,65 & OVER: ICD-10-PCS | Mod: HCNC,S$GLB,, | Performed by: FAMILY MEDICINE

## 2023-01-27 PROCEDURE — 3008F BODY MASS INDEX DOCD: CPT | Mod: HCNC,CPTII,S$GLB, | Performed by: FAMILY MEDICINE

## 2023-01-27 PROCEDURE — 1101F PT FALLS ASSESS-DOCD LE1/YR: CPT | Mod: HCNC,CPTII,S$GLB, | Performed by: FAMILY MEDICINE

## 2023-01-27 PROCEDURE — 3008F PR BODY MASS INDEX (BMI) DOCUMENTED: ICD-10-PCS | Mod: HCNC,CPTII,S$GLB, | Performed by: FAMILY MEDICINE

## 2023-01-27 PROCEDURE — 99397 PER PM REEVAL EST PAT 65+ YR: CPT | Mod: HCNC,S$GLB,, | Performed by: FAMILY MEDICINE

## 2023-01-27 PROCEDURE — 3074F PR MOST RECENT SYSTOLIC BLOOD PRESSURE < 130 MM HG: ICD-10-PCS | Mod: HCNC,CPTII,S$GLB, | Performed by: FAMILY MEDICINE

## 2023-01-27 PROCEDURE — 3288F PR FALLS RISK ASSESSMENT DOCUMENTED: ICD-10-PCS | Mod: HCNC,CPTII,S$GLB, | Performed by: FAMILY MEDICINE

## 2023-01-27 PROCEDURE — 1101F PR PT FALLS ASSESS DOC 0-1 FALLS W/OUT INJ PAST YR: ICD-10-PCS | Mod: HCNC,CPTII,S$GLB, | Performed by: FAMILY MEDICINE

## 2023-01-27 PROCEDURE — 99999 PR PBB SHADOW E&M-EST. PATIENT-LVL III: CPT | Mod: PBBFAC,HCNC,, | Performed by: FAMILY MEDICINE

## 2023-01-27 PROCEDURE — 3288F FALL RISK ASSESSMENT DOCD: CPT | Mod: HCNC,CPTII,S$GLB, | Performed by: FAMILY MEDICINE

## 2023-01-27 PROCEDURE — 3078F PR MOST RECENT DIASTOLIC BLOOD PRESSURE < 80 MM HG: ICD-10-PCS | Mod: HCNC,CPTII,S$GLB, | Performed by: FAMILY MEDICINE

## 2023-01-27 PROCEDURE — 3078F DIAST BP <80 MM HG: CPT | Mod: HCNC,CPTII,S$GLB, | Performed by: FAMILY MEDICINE

## 2023-01-27 NOTE — PATIENT INSTRUCTIONS
Look into getting the Shingles vaccine (SHINGRIX) at your local pharmacy (2 part series, done once at/after age 50)    Think about 1 time pneumonia shot after 65 and updated covid booster.

## 2023-01-27 NOTE — PROGRESS NOTES
(Portions of this note were dictated using voice recognition software and may contain dictation related errors in spelling/grammar/syntax not found on text review)    CC:   Chief Complaint   Patient presents with    Annual Exam       HPI: 67 y.o. male          Anxiety/depression/insomnia.  Prior was on Cymbalta and trazodone.  Currently not taking these.  Initially a lot of the mood issues seem to be very centrally associated with poor sleep.  Sleep is doing much better now for the most part, takes trazodone occasionally since he just had knee surgery and sometimes pain keeps him up.  However, his mood is markedly improved.  Off of his Cymbalta.      Other  prior meds tried:  Hydroxyzine, made very groggy but did not quite help with sleep  Melatonin incompletely effective for sleep controlled  Lexapro:  ED  Zoloft:  ED  Wellbutrin:  Unsure effect profile      Dyslipidemia:  On Lipitor 10 mg every other day      Elevated left hemidiaphragm, has chronic shortness of breath.  Had seen pulmonology in 2018.  6 min walk showed moderate dyspnea but no desaturation.  PSG showed sleep apnea and recommended CPAP but he could not tolerate.    Had outside methacholine challenge test which was nondiagnostic for asthma although did not preclude considering trial of beta agonist therapy.    Also has prior workup for chest pain symptoms had done a nuclear stress which showed large fixed perfusion defect inferolateral aspect normal ejection fraction.  Had subsequently seen cardiology.  There was some question whether some of this was artifact from his elevated hemidiaphragm, CTA showed low calcium score very minimal amount of plaque but no other significant coronary artery disease.     History of torn meniscus , has followed with outside orthopedics   Had right knee arthroscopy with partial meniscectomy. Did physical therapy .  Still was having a lot of pain.  Saw another orthopedist and had a steroid shot which helped a lot.  He is  back into physical therapy.     Answers submitted by the patient for this visit:  Review of Systems Questionnaire (Submitted on 1/26/2023)  activity change: No  unexpected weight change: No  neck pain: No  hearing loss: No  rhinorrhea: No  trouble swallowing: No  eye discharge: No  visual disturbance: No  chest tightness: No  wheezing: No  chest pain: No  palpitations: No  blood in stool: No  constipation: No  vomiting: No  diarrhea: No  polydipsia: No  polyuria: No  difficulty urinating: No  urgency: No  hematuria: No  joint swelling: No  arthralgias: No  headaches: No  weakness: No  confusion: No  dysphoric mood: No      Past Medical History:   Diagnosis Date    Elevated hemidiaphragm     HLD (hyperlipidemia)     Meniscal injury        Past Surgical History:   Procedure Laterality Date    COLONOSCOPY      NO PAST SURGERIES         Family History   Problem Relation Age of Onset    Skin cancer Father     Diabetes Neg Hx     Cancer Neg Hx     Coronary artery disease Neg Hx     Melanoma Neg Hx        Social History     Socioeconomic History    Marital status: Single   Tobacco Use    Smoking status: Never    Smokeless tobacco: Never   Substance and Sexual Activity    Alcohol use: No    Drug use: No    Sexual activity: Yes     Partners: Female     Social Determinants of Health     Financial Resource Strain: Low Risk     Difficulty of Paying Living Expenses: Not hard at all   Food Insecurity: No Food Insecurity    Worried About Running Out of Food in the Last Year: Never true    Ran Out of Food in the Last Year: Never true   Transportation Needs: No Transportation Needs    Lack of Transportation (Medical): No    Lack of Transportation (Non-Medical): No   Physical Activity: Insufficiently Active    Days of Exercise per Week: 2 days    Minutes of Exercise per Session: 30 min   Stress: No Stress Concern Present    Feeling of Stress : Only a little   Social Connections: Unknown    Frequency of Communication with Friends and  "Family: More than three times a week    Frequency of Social Gatherings with Friends and Family: More than three times a week    Active Member of Clubs or Organizations: Yes    Attends Club or Organization Meetings: More than 4 times per year    Marital Status:    Housing Stability: Low Risk     Unable to Pay for Housing in the Last Year: No    Number of Places Lived in the Last Year: 1    Unstable Housing in the Last Year: No     Lab Results   Component Value Date    WBC 5.6 12/30/2022    HGB 14.5 12/30/2022    HCT 43.9 12/30/2022    MCV 86.2 12/30/2022     12/30/2022    CHOL 207 (H) 12/30/2022    TRIG 111 12/30/2022    HDL 55 12/30/2022    ALT 16 12/30/2022    AST 17 12/30/2022    BILITOT 0.5 12/30/2022    ALKPHOS 66 08/31/2020     12/30/2022    K 4.1 12/30/2022     12/30/2022    CREATININE 1.28 12/30/2022    ESTGFRAFRICA >60.0 08/31/2020    EGFRNONAA 56.9 (A) 08/31/2020    EGFRNORACEVR 61 12/30/2022    CALCIUM 9.0 12/30/2022    ALBUMIN 4.2 12/30/2022    BUN 17 12/30/2022    CO2 27 12/30/2022    TSH 3.33 12/30/2022    PSA 2.7 02/27/2018    PSADIAG 2.12 12/30/2022    LDLCALC 130 (H) 12/30/2022    GLU 87 12/30/2022                       Vital signs reviewed  Vitals:    01/27/23 1511   BP: 118/76   BP Location: Left arm   Patient Position: Sitting   BP Method: Large (Manual)   Pulse: 64   Temp: 98 °F (36.7 °C)   TempSrc: Oral   SpO2: 98%   Weight: 89.7 kg (197 lb 12 oz)   Height: 6' 1" (1.854 m)       Wt Readings from Last 4 Encounters:   01/27/23 89.7 kg (197 lb 12 oz)   02/25/22 90 kg (198 lb 6.6 oz)   09/10/21 89.8 kg (197 lb 15.6 oz)   03/04/21 89.8 kg (198 lb)       PE:   APPEARANCE: Well nourished, well developed, in no acute distress.    HEAD: Normocephalic, atraumatic.  EYES: PERRL. EOMI.   Conjunctivae noninjected.  EARS: TM's intact. Light reflex normal. No retraction or perforation.    NOSE: Mucosa pink. Airway clear.  MOUTH & THROAT: No tonsillar enlargement. No pharyngeal " erythema or exudate.   NECK: Supple with no cervical lymphadenopathy.  No carotid bruits.  No thyromegaly  CHEST: Good inspiratory effort. Lungs clear to auscultation with no wheezes or crackles.  CARDIOVASCULAR: Normal S1, S2. No rubs, murmurs, or gallops.  ABDOMEN: Bowel sounds normal. Not distended. Soft. No tenderness or masses. No organomegaly.  EXTREMITIES:  no edema      IMPRESSION  1. Routine general medical examination at a health care facility    2. Hyperlipidemia, unspecified hyperlipidemia type    3. Elevated hemidiaphragm          PLAN    Reviewed labs, reassurance    Depression:  Full Remission, occasional trazodone for insomnia    Continue Lipitor for hyperlipidemia       Continue orthopedic and physical therapy follow-up for his knee       SCREENINGS  Colonoscopy was due back 2021 but opted for fit test which was negative on 06/15/2021.   Repeat fit kit  negative on 09/17/2022    Prostate: sees urology, PSA  12/30/2022     Immunizations  Tdap 2016  Pneumococcal vaccine due although counseling provided today but patient decline  COVID 04/20/2021, 07/21/2021, advise on booster  Flu:, decline   Shingles: Can get at pharmacy

## 2023-02-07 DIAGNOSIS — Z00.00 ENCOUNTER FOR MEDICARE ANNUAL WELLNESS EXAM: ICD-10-CM

## 2023-02-09 DIAGNOSIS — Z00.00 ENCOUNTER FOR MEDICARE ANNUAL WELLNESS EXAM: ICD-10-CM

## 2023-03-15 ENCOUNTER — PATIENT MESSAGE (OUTPATIENT)
Dept: FAMILY MEDICINE | Facility: CLINIC | Age: 68
End: 2023-03-15
Payer: MEDICAID

## 2023-03-24 ENCOUNTER — OFFICE VISIT (OUTPATIENT)
Dept: FAMILY MEDICINE | Facility: CLINIC | Age: 68
End: 2023-03-24
Payer: COMMERCIAL

## 2023-03-24 VITALS
WEIGHT: 195.75 LBS | BODY MASS INDEX: 25.94 KG/M2 | OXYGEN SATURATION: 97 % | SYSTOLIC BLOOD PRESSURE: 110 MMHG | HEART RATE: 74 BPM | DIASTOLIC BLOOD PRESSURE: 72 MMHG | TEMPERATURE: 98 F | HEIGHT: 73 IN

## 2023-03-24 DIAGNOSIS — G56.03 CARPAL TUNNEL SYNDROME, BILATERAL: Primary | ICD-10-CM

## 2023-03-24 PROCEDURE — 1126F AMNT PAIN NOTED NONE PRSNT: CPT | Mod: HCNC,CPTII,S$GLB, | Performed by: FAMILY MEDICINE

## 2023-03-24 PROCEDURE — 99214 OFFICE O/P EST MOD 30 MIN: CPT | Mod: HCNC,S$GLB,, | Performed by: FAMILY MEDICINE

## 2023-03-24 PROCEDURE — 1101F PR PT FALLS ASSESS DOC 0-1 FALLS W/OUT INJ PAST YR: ICD-10-PCS | Mod: HCNC,CPTII,S$GLB, | Performed by: FAMILY MEDICINE

## 2023-03-24 PROCEDURE — 3074F SYST BP LT 130 MM HG: CPT | Mod: HCNC,CPTII,S$GLB, | Performed by: FAMILY MEDICINE

## 2023-03-24 PROCEDURE — 3288F FALL RISK ASSESSMENT DOCD: CPT | Mod: HCNC,CPTII,S$GLB, | Performed by: FAMILY MEDICINE

## 2023-03-24 PROCEDURE — 1101F PT FALLS ASSESS-DOCD LE1/YR: CPT | Mod: HCNC,CPTII,S$GLB, | Performed by: FAMILY MEDICINE

## 2023-03-24 PROCEDURE — 1126F PR PAIN SEVERITY QUANTIFIED, NO PAIN PRESENT: ICD-10-PCS | Mod: HCNC,CPTII,S$GLB, | Performed by: FAMILY MEDICINE

## 2023-03-24 PROCEDURE — 3008F PR BODY MASS INDEX (BMI) DOCUMENTED: ICD-10-PCS | Mod: HCNC,CPTII,S$GLB, | Performed by: FAMILY MEDICINE

## 2023-03-24 PROCEDURE — 99999 PR PBB SHADOW E&M-EST. PATIENT-LVL IV: CPT | Mod: PBBFAC,HCNC,, | Performed by: FAMILY MEDICINE

## 2023-03-24 PROCEDURE — 1159F MED LIST DOCD IN RCRD: CPT | Mod: HCNC,CPTII,S$GLB, | Performed by: FAMILY MEDICINE

## 2023-03-24 PROCEDURE — 99999 PR PBB SHADOW E&M-EST. PATIENT-LVL IV: ICD-10-PCS | Mod: PBBFAC,HCNC,, | Performed by: FAMILY MEDICINE

## 2023-03-24 PROCEDURE — 3078F PR MOST RECENT DIASTOLIC BLOOD PRESSURE < 80 MM HG: ICD-10-PCS | Mod: HCNC,CPTII,S$GLB, | Performed by: FAMILY MEDICINE

## 2023-03-24 PROCEDURE — 3008F BODY MASS INDEX DOCD: CPT | Mod: HCNC,CPTII,S$GLB, | Performed by: FAMILY MEDICINE

## 2023-03-24 PROCEDURE — 99214 PR OFFICE/OUTPT VISIT, EST, LEVL IV, 30-39 MIN: ICD-10-PCS | Mod: HCNC,S$GLB,, | Performed by: FAMILY MEDICINE

## 2023-03-24 PROCEDURE — 3078F DIAST BP <80 MM HG: CPT | Mod: HCNC,CPTII,S$GLB, | Performed by: FAMILY MEDICINE

## 2023-03-24 PROCEDURE — 3288F PR FALLS RISK ASSESSMENT DOCUMENTED: ICD-10-PCS | Mod: HCNC,CPTII,S$GLB, | Performed by: FAMILY MEDICINE

## 2023-03-24 PROCEDURE — 3074F PR MOST RECENT SYSTOLIC BLOOD PRESSURE < 130 MM HG: ICD-10-PCS | Mod: HCNC,CPTII,S$GLB, | Performed by: FAMILY MEDICINE

## 2023-03-24 PROCEDURE — 1159F PR MEDICATION LIST DOCUMENTED IN MEDICAL RECORD: ICD-10-PCS | Mod: HCNC,CPTII,S$GLB, | Performed by: FAMILY MEDICINE

## 2023-03-24 NOTE — PROGRESS NOTES
(Portions of this note were dictated using voice recognition software and may contain dictation related errors in spelling/grammar/syntax not found on text review)    CC:   Chief Complaint   Patient presents with    Numbness     In arms       HPI: 67 y.o. male seen for annual exam in January.  Here for urgent care concern for paresthesias of the upper extremities.  Sent a message on 03/15 concerned that he had woken up middle the tingling in both arms from elbow to the tips of his fingers, lasted on and off for 2 hours.  No injury reported.  Dull pain.  Had numbness . Also happens on computer occasionally, but happening every night. Straightening and lowering hands will resolve the symptoms.     25 yr ago had gone to sleep on sofa with arms behind his head for 8 hours, woke up with arms, legs, numb. That episode has not recurred but periodic arm numbness as above.           Past Medical History:   Diagnosis Date    Elevated hemidiaphragm     HLD (hyperlipidemia)     Meniscal injury        Past Surgical History:   Procedure Laterality Date    COLONOSCOPY      NO PAST SURGERIES         Family History   Problem Relation Age of Onset    Skin cancer Father     Diabetes Neg Hx     Cancer Neg Hx     Coronary artery disease Neg Hx     Melanoma Neg Hx        Social History     Tobacco Use    Smoking status: Never    Smokeless tobacco: Never   Substance Use Topics    Alcohol use: No    Drug use: No       Lab Results   Component Value Date    WBC 5.6 12/30/2022    HGB 14.5 12/30/2022    HCT 43.9 12/30/2022    MCV 86.2 12/30/2022     12/30/2022    CHOL 207 (H) 12/30/2022    TRIG 111 12/30/2022    HDL 55 12/30/2022    ALT 16 12/30/2022    AST 17 12/30/2022    BILITOT 0.5 12/30/2022    ALKPHOS 66 08/31/2020     12/30/2022    K 4.1 12/30/2022     12/30/2022    CREATININE 1.28 12/30/2022    ESTGFRAFRICA >60.0 08/31/2020    EGFRNONAA 56.9 (A) 08/31/2020    EGFRNORACEVR 61 12/30/2022    CALCIUM 9.0 12/30/2022     "ALBUMIN 4.2 12/30/2022    BUN 17 12/30/2022    CO2 27 12/30/2022    TSH 3.33 12/30/2022    PSA 2.7 02/27/2018    PSADIAG 2.12 12/30/2022    LDLCALC 130 (H) 12/30/2022    GLU 87 12/30/2022                 Vital signs reviewed  Vitals:    03/24/23 1117   BP: 110/72   BP Location: Right arm   Patient Position: Sitting   BP Method: Medium (Manual)   Pulse: 74   Temp: 98 °F (36.7 °C)   TempSrc: Oral   SpO2: 97%   Weight: 88.8 kg (195 lb 12.3 oz)   Height: 6' 1" (1.854 m)       Wt Readings from Last 4 Encounters:   03/24/23 88.8 kg (195 lb 12.3 oz)   01/27/23 89.7 kg (197 lb 12 oz)   02/25/22 90 kg (198 lb 6.6 oz)   09/10/21 89.8 kg (197 lb 15.6 oz)       PE:   APPEARANCE: Well nourished, well developed, in no acute distress.    EXTREMITIES:  Normal brachioradialis and biceps reflexes bilaterally.  Normal  strength.  Normal interosseous strength.  Normal deltoid strength and biceps strength.  Normal wrist dorsiflexion and volar flexion against resistance.  Positive Tinel sign referring paresthesias to middle fingers bilaterally.  Positive Phalen's sign referring paresthesias to index and middle fingers bilaterally.  Negative cubital Tinel sign.  Negative Spurling sign.      IMPRESSION  1. Carpal tunnel syndrome, bilateral            PLAN  Suspect bilateral carpal tunnel syndrome.  Advise wrist bracing for the next 3-4 weeks at bedtime and with any repetitive activity.  Advise tendon/nerve gliding exercises which have been printed.  Notify for any progression of symptoms over the next several weeks in which case we can consider bilateral upper extremity EMG/NCV.    Total time spent including face-to-face time and chart time:  30 min               "

## 2023-11-20 RX ORDER — ATORVASTATIN CALCIUM 20 MG/1
TABLET, FILM COATED ORAL
Qty: 90 TABLET | Refills: 3 | Status: SHIPPED | OUTPATIENT
Start: 2023-11-20 | End: 2023-11-20 | Stop reason: SDUPTHER

## 2023-11-20 RX ORDER — ATORVASTATIN CALCIUM 20 MG/1
TABLET, FILM COATED ORAL
Qty: 90 TABLET | Refills: 3 | Status: SHIPPED | OUTPATIENT
Start: 2023-11-20

## 2023-11-24 ENCOUNTER — PATIENT MESSAGE (OUTPATIENT)
Dept: FAMILY MEDICINE | Facility: CLINIC | Age: 68
End: 2023-11-24
Payer: MEDICARE

## 2023-11-24 RX ORDER — TRAZODONE HYDROCHLORIDE 50 MG/1
50 TABLET ORAL NIGHTLY
Qty: 90 TABLET | Refills: 3 | Status: SHIPPED | OUTPATIENT
Start: 2023-11-24 | End: 2024-11-23

## 2023-11-27 LAB — HEMOCCULT STL QL IA: NEGATIVE

## 2023-12-19 ENCOUNTER — PATIENT OUTREACH (OUTPATIENT)
Dept: ADMINISTRATIVE | Facility: HOSPITAL | Age: 68
End: 2023-12-19
Payer: MEDICARE

## 2023-12-19 NOTE — PROGRESS NOTES
Care Everywhere updates requested and reviewed.  Immunizations reconciled. Media reports reviewed.  Duplicate HM overrides and  orders removed.  Overdue HM topic chart audit and/or requested.       HM updated with external COLON report.       Health Maintenance Due   Topic Date Due    Hemoglobin A1c (Diabetic Prevention Screening)  Never done    Shingles Vaccine (1 of 2) Never done    RSV Vaccine (Age 60+ and Pregnant patients) (1 - 1-dose 60+ series) Never done    Pneumococcal Vaccines (Age 65+) (1 - PCV) Never done    Influenza Vaccine (1) Never done    COVID-19 Vaccine (3 - -24 season) 2023

## 2024-01-11 ENCOUNTER — OFFICE VISIT (OUTPATIENT)
Dept: FAMILY MEDICINE | Facility: CLINIC | Age: 69
End: 2024-01-11
Payer: MEDICARE

## 2024-01-11 VITALS
HEART RATE: 73 BPM | SYSTOLIC BLOOD PRESSURE: 93 MMHG | WEIGHT: 189.5 LBS | HEIGHT: 73 IN | DIASTOLIC BLOOD PRESSURE: 66 MMHG | BODY MASS INDEX: 25.12 KG/M2 | RESPIRATION RATE: 18 BRPM

## 2024-01-11 DIAGNOSIS — G47.00 INSOMNIA, UNSPECIFIED TYPE: ICD-10-CM

## 2024-01-11 DIAGNOSIS — I70.0 AORTIC ATHEROSCLEROSIS: ICD-10-CM

## 2024-01-11 DIAGNOSIS — N40.0 BENIGN PROSTATIC HYPERPLASIA WITHOUT LOWER URINARY TRACT SYMPTOMS: ICD-10-CM

## 2024-01-11 DIAGNOSIS — G47.33 OSA (OBSTRUCTIVE SLEEP APNEA): ICD-10-CM

## 2024-01-11 DIAGNOSIS — Z00.00 ENCOUNTER FOR MEDICARE ANNUAL WELLNESS EXAM: Primary | ICD-10-CM

## 2024-01-11 DIAGNOSIS — E78.5 HYPERLIPIDEMIA, UNSPECIFIED HYPERLIPIDEMIA TYPE: ICD-10-CM

## 2024-01-11 PROCEDURE — 3288F FALL RISK ASSESSMENT DOCD: CPT | Mod: HCNC,CPTII,S$GLB, | Performed by: NURSE PRACTITIONER

## 2024-01-11 PROCEDURE — 3074F SYST BP LT 130 MM HG: CPT | Mod: HCNC,CPTII,S$GLB, | Performed by: NURSE PRACTITIONER

## 2024-01-11 PROCEDURE — 1101F PT FALLS ASSESS-DOCD LE1/YR: CPT | Mod: HCNC,CPTII,S$GLB, | Performed by: NURSE PRACTITIONER

## 2024-01-11 PROCEDURE — 1159F MED LIST DOCD IN RCRD: CPT | Mod: HCNC,CPTII,S$GLB, | Performed by: NURSE PRACTITIONER

## 2024-01-11 PROCEDURE — 99999 PR PBB SHADOW E&M-EST. PATIENT-LVL IV: CPT | Mod: PBBFAC,HCNC,, | Performed by: NURSE PRACTITIONER

## 2024-01-11 PROCEDURE — G0439 PPPS, SUBSEQ VISIT: HCPCS | Mod: HCNC,S$GLB,, | Performed by: NURSE PRACTITIONER

## 2024-01-11 PROCEDURE — G9919 SCRN ND POS ND PROV OF REC: HCPCS | Mod: HCNC,CPTII,S$GLB, | Performed by: NURSE PRACTITIONER

## 2024-01-11 PROCEDURE — 1170F FXNL STATUS ASSESSED: CPT | Mod: HCNC,CPTII,S$GLB, | Performed by: NURSE PRACTITIONER

## 2024-01-11 PROCEDURE — 3078F DIAST BP <80 MM HG: CPT | Mod: HCNC,CPTII,S$GLB, | Performed by: NURSE PRACTITIONER

## 2024-01-11 RX ORDER — AMOXICILLIN 500 MG
CAPSULE ORAL DAILY
COMMUNITY

## 2024-01-11 RX ORDER — FOLIC ACID 0.8 MG
800 TABLET ORAL DAILY
COMMUNITY

## 2024-01-11 RX ORDER — PRASTERONE (DHEA) 50 MG
CAPSULE ORAL
COMMUNITY

## 2024-01-11 RX ORDER — ERGOCALCIFEROL 1.25 MG/1
50000 CAPSULE ORAL
COMMUNITY

## 2024-01-11 RX ORDER — CALCIUM CARBONATE 500(1250)
1 TABLET ORAL DAILY
COMMUNITY

## 2024-01-11 RX ORDER — GUAIFENESIN 100 MG/5ML
81 LIQUID (ML) ORAL DAILY
COMMUNITY

## 2024-01-11 RX ORDER — GARLIC 1000 MG
CAPSULE ORAL
COMMUNITY

## 2024-01-11 RX ORDER — CRANBERRY FRUIT EXTRACT 650 MG
CAPSULE ORAL
COMMUNITY

## 2024-01-11 RX ORDER — ZINC GLUCONATE 50 MG
50 TABLET ORAL DAILY
COMMUNITY

## 2024-01-11 RX ORDER — LANOLIN ALCOHOL/MO/W.PET/CERES
1 CREAM (GRAM) TOPICAL
COMMUNITY

## 2024-01-11 NOTE — PROGRESS NOTES
"  Melo Zuñiga presented for a  Medicare AWV and comprehensive Health Risk Assessment today. The following components were reviewed and updated:    Medical history  Family History  Social history  Allergies and Current Medications  Health Risk Assessment  Health Maintenance  Care Team     ** See Completed Assessments for Annual Wellness Visit within the encounter summary.**     The following assessments were completed:  Living Situation  CAGE  Depression Screening  Timed Get Up and Go  Whisper Test  Cognitive Function Screening  Nutrition Screening  ADL Screening  PAQ Screening    Vitals:    01/11/24 1409   BP: 93/66   BP Location: Right arm   Pulse: 73   Resp: 18   Weight: 86 kg (189 lb 8 oz)   Height: 6' 1" (1.854 m)     Body mass index is 25 kg/m².  Physical Exam  Vitals reviewed.   Constitutional:       General: He is not in acute distress.     Appearance: Normal appearance. He is not ill-appearing.   HENT:      Head: Normocephalic and atraumatic.      Right Ear: External ear normal.      Left Ear: External ear normal.      Nose: Nose normal.   Eyes:      Extraocular Movements: Extraocular movements intact.      Conjunctiva/sclera: Conjunctivae normal.   Cardiovascular:      Rate and Rhythm: Normal rate.      Heart sounds: Normal heart sounds.   Pulmonary:      Effort: Pulmonary effort is normal. No respiratory distress.      Breath sounds: Normal breath sounds.   Abdominal:      General: Abdomen is flat. There is no distension.   Musculoskeletal:         General: Normal range of motion.      Cervical back: Normal range of motion.   Skin:     General: Skin is warm and dry.      Capillary Refill: Capillary refill takes less than 2 seconds.      Coloration: Skin is not pale.      Findings: No rash.   Neurological:      General: No focal deficit present.      Mental Status: He is alert and oriented to person, place, and time. Mental status is at baseline.   Psychiatric:         Mood and Affect: Mood normal.         " "Speech: Speech normal. Speech is not rapid and pressured, delayed or slurred.         Behavior: Behavior normal. Behavior is not agitated, slowed, aggressive, withdrawn, hyperactive or combative. Behavior is cooperative.         Thought Content: Thought content normal.         Judgment: Judgment normal.           Diagnoses and health risks identified today and associated recommendations/orders:    1. Encounter for Medicare annual wellness exam  Complete history and physical was completed today.  Complete and thorough medication reconciliation was performed.  Discussed risks and benefits of medications.  Advised patient on orders and health maintenance.  We discussed old records and old labs if available.  Will request any records not available through epic.  Continue current medications listed on your summary sheet.  Advised of due vaccines; defers today  No results found for: "LABA1C", "HGBA1C"  Defer A1C to PCP; no known history of diabetes    - Ambulatory Referral/Consult to Enhanced Annual Wellness Visit (eAWV)    2. Hyperlipidemia, unspecified hyperlipidemia type  Chronic. Stable.  Continue current medications and plan of care per PCP and specialists     -recent labs listed below:  Lab Results   Component Value Date    CHOL 207 (H) 12/30/2022     Lab Results   Component Value Date    HDL 55 12/30/2022     Lab Results   Component Value Date    LDLCALC 130 (H) 12/30/2022     Lab Results   Component Value Date    TRIG 111 12/30/2022     Lab Results   Component Value Date    ALT 16 12/30/2022    AST 17 12/30/2022    ALKPHOS 66 08/31/2020    BILITOT 0.5 12/30/2022     Hyperlipidemia Medications               atorvastatin (LIPITOR) 20 MG tablet TAKE 1/2 TABLET EVERY OTHER DAY Strength: 20 mg    omega-3 fatty acids/fish oil (FISH OIL-OMEGA-3 FATTY ACIDS) 300-1,000 mg capsule Take by mouth once daily.     3. JILLIAN (obstructive sleep apnea)  Chronic. Stable.  Continue current medications and plan of care per PCP and " specialists     4. Insomnia, unspecified type  Chronic. Stable.  Continue trazodone   Continue current medications and plan of care per PCP and specialists     5. Benign prostatic hyperplasia without lower urinary tract symptoms  Chronic. Stable.  Continue current medications and plan of care per PCP and specialists     PSA, Screen (ng/mL)   Date Value   02/27/2018 2.7     6. Aortic atherosclerosis  Chronic. Stable.  Continue current medications and plan of care per PCP and specialists     I offered to discuss end of life issues, including information on how to make advance directives that the patient could use to name someone who would make medical decisions on their behalf if they became too ill to make themselves.    ___Patient declined - already done.    _x__Patient is interested, I provided paperwork and offered to discuss.    Provided Melo with a 5-10 year written screening schedule and personal prevention plan. Recommendations were developed using the USPSTF age appropriate recommendations. Education, counseling, and referrals were provided as needed. After Visit Summary printed and given to patient which includes a list of additional screenings\tests needed.    Follow up for visit in clinic as needed.    Erika Aguero NP

## 2024-02-25 ENCOUNTER — PATIENT MESSAGE (OUTPATIENT)
Dept: ADMINISTRATIVE | Facility: OTHER | Age: 69
End: 2024-02-25
Payer: MEDICARE

## 2024-03-21 LAB — CRC RECOMMENDATION EXT: NORMAL

## 2024-03-22 ENCOUNTER — PATIENT OUTREACH (OUTPATIENT)
Dept: ADMINISTRATIVE | Facility: HOSPITAL | Age: 69
End: 2024-03-22
Payer: MEDICARE

## 2024-03-22 NOTE — PROGRESS NOTES
Population Health Chart Review & Patient Outreach Details      Additional [facility] Health Notes:    Hyperlinked colonoscopy in        Updates Requested / Reviewed:      Updated Care Coordination Note, Care Everywhere, , Care Team Updated, and Immunizations Reconciliation Completed or Queried: [facility] Topics Overdue:      [facility] Score: 1     Hemoglobin A1c    Influenza Vaccine  Pneumonia Vaccine  Shingles/Zoster Vaccine  RSV Vaccine                  Health Maintenance Topic(s) Outreach Outcomes & Actions Taken:    Colorectal Cancer Screening - Outreach Outcomes & Actions Taken  : External Records Uploaded, Care Team Updated, & History Updated if Applicable

## 2024-05-20 ENCOUNTER — OFFICE VISIT (OUTPATIENT)
Dept: FAMILY MEDICINE | Facility: CLINIC | Age: 69
End: 2024-05-20
Payer: MEDICARE

## 2024-05-20 VITALS
DIASTOLIC BLOOD PRESSURE: 78 MMHG | OXYGEN SATURATION: 98 % | TEMPERATURE: 99 F | SYSTOLIC BLOOD PRESSURE: 114 MMHG | HEIGHT: 73 IN | BODY MASS INDEX: 25.88 KG/M2 | HEART RATE: 74 BPM | WEIGHT: 195.31 LBS

## 2024-05-20 DIAGNOSIS — N40.0 BENIGN PROSTATIC HYPERPLASIA WITHOUT LOWER URINARY TRACT SYMPTOMS: ICD-10-CM

## 2024-05-20 DIAGNOSIS — R79.9 ABNORMAL FINDING OF BLOOD CHEMISTRY, UNSPECIFIED: ICD-10-CM

## 2024-05-20 DIAGNOSIS — G47.00 INSOMNIA, UNSPECIFIED TYPE: ICD-10-CM

## 2024-05-20 DIAGNOSIS — E78.5 HYPERLIPIDEMIA, UNSPECIFIED HYPERLIPIDEMIA TYPE: Primary | ICD-10-CM

## 2024-05-20 DIAGNOSIS — Z00.00 ROUTINE GENERAL MEDICAL EXAMINATION AT A HEALTH CARE FACILITY: ICD-10-CM

## 2024-05-20 DIAGNOSIS — R53.83 FATIGUE, UNSPECIFIED TYPE: ICD-10-CM

## 2024-05-20 DIAGNOSIS — G47.33 OSA (OBSTRUCTIVE SLEEP APNEA): ICD-10-CM

## 2024-05-20 DIAGNOSIS — I70.0 AORTIC ATHEROSCLEROSIS: ICD-10-CM

## 2024-05-20 DIAGNOSIS — Z79.899 OTHER LONG TERM (CURRENT) DRUG THERAPY: ICD-10-CM

## 2024-05-20 DIAGNOSIS — Z12.5 SCREENING FOR MALIGNANT NEOPLASM OF PROSTATE: ICD-10-CM

## 2024-05-20 PROCEDURE — 1159F MED LIST DOCD IN RCRD: CPT | Mod: CPTII,S$GLB,, | Performed by: FAMILY MEDICINE

## 2024-05-20 PROCEDURE — 99397 PER PM REEVAL EST PAT 65+ YR: CPT | Mod: S$GLB,,, | Performed by: FAMILY MEDICINE

## 2024-05-20 PROCEDURE — 3078F DIAST BP <80 MM HG: CPT | Mod: CPTII,S$GLB,, | Performed by: FAMILY MEDICINE

## 2024-05-20 PROCEDURE — 1101F PT FALLS ASSESS-DOCD LE1/YR: CPT | Mod: CPTII,S$GLB,, | Performed by: FAMILY MEDICINE

## 2024-05-20 PROCEDURE — 1160F RVW MEDS BY RX/DR IN RCRD: CPT | Mod: CPTII,S$GLB,, | Performed by: FAMILY MEDICINE

## 2024-05-20 PROCEDURE — 99999 PR PBB SHADOW E&M-EST. PATIENT-LVL IV: CPT | Mod: PBBFAC,,, | Performed by: FAMILY MEDICINE

## 2024-05-20 PROCEDURE — 3008F BODY MASS INDEX DOCD: CPT | Mod: CPTII,S$GLB,, | Performed by: FAMILY MEDICINE

## 2024-05-20 PROCEDURE — 3074F SYST BP LT 130 MM HG: CPT | Mod: CPTII,S$GLB,, | Performed by: FAMILY MEDICINE

## 2024-05-20 PROCEDURE — 3288F FALL RISK ASSESSMENT DOCD: CPT | Mod: CPTII,S$GLB,, | Performed by: FAMILY MEDICINE

## 2024-05-20 PROCEDURE — 1125F AMNT PAIN NOTED PAIN PRSNT: CPT | Mod: CPTII,S$GLB,, | Performed by: FAMILY MEDICINE

## 2024-05-20 NOTE — PROGRESS NOTES
(Portions of this note were dictated using voice recognition software and may contain dictation related errors in spelling/grammar/syntax not found on text review)    CC:   Chief Complaint   Patient presents with    Annual Exam    Fecal Incontinence     When urinating       HPI: 68 y.o. male         Anxiety/depression/insomnia.  Prior was on Cymbalta and trazodone.  Currently not taking these.  Initially a lot of the mood issues seem to be very centrally associated with poor sleep.  Sleep is doing much better now for the most part, takes trazodone occasionally since he just had knee surgery and sometimes pain keeps him up.  However, his mood is markedly improved.  Off of his Cymbalta.    Other  prior meds tried:  Hydroxyzine, made very groggy but did not quite help with sleep  Melatonin incompletely effective for sleep controlled  Lexapro:  ED  Zoloft:  ED  Wellbutrin:  Unsure effect profile      Dyslipidemia:  On Lipitor 10 mg every other day      Elevated left hemidiaphragm, has chronic shortness of breath.  Had seen pulmonology in 2018.  6 min walk showed moderate dyspnea but no desaturation.  PSG showed sleep apnea and recommended CPAP but he could not tolerate.    Had outside methacholine challenge test which was nondiagnostic for asthma although did not preclude considering trial of beta agonist therapy.    Also has prior workup for chest pain symptoms had done a nuclear stress which showed large fixed perfusion defect inferolateral aspect normal ejection fraction.  Had subsequently seen cardiology.  There was some question whether some of this was artifact from his elevated hemidiaphragm, CTA showed low calcium score very minimal amount of plaque but no other significant coronary artery disease.     History of torn meniscus , has followed with outside orthopedics   Had right knee arthroscopy with partial meniscectomy. Did physical therapy .  Still does not feel like it is healed.  Seeing ortho tomorrow at  [facility].  Does feel pain in his right hip 2, was not sure if it was related to sciatica.  However he denies any paresthesias down his leg, symptoms are localized to the outer low back/buttock/hip region.    Notices fecal incontinence with urination.  Currently enrolled in pelvic floor PT.  Followed by outside GI Dr. Grijalva.  Colonoscopy 03/21/2024.  Mild diverticulosis sigmoid colon, repeat in 10 years. Had EGD had h pylori, completed abx, going back for breath test.     Generally just feels fatigued, given the issues with his knee, he is had significantly decreased activity level and notices decreased exercise capacity.  Does occasionally notice some decreased sexual desire, decreased ejaculation volume.  Will occasionally take PD 5 inhibitor and is able to achieve erection.  Was wondering about further lab workup.     Past Medical History:   Diagnosis Date    Elevated hemidiaphragm     HLD (hyperlipidemia)     Meniscal injury        Past Surgical History:   Procedure Laterality Date    COLONOSCOPY      NO PAST SURGERIES         Family History   Problem Relation Name Age of Onset    Skin cancer Father      Diabetes Neg Hx      Cancer Neg Hx      Coronary artery disease Neg Hx      Melanoma Neg Hx         Social History     Socioeconomic History    Marital status: Single   Tobacco Use    Smoking status: Never    Smokeless tobacco: Never   Substance and Sexual Activity    Alcohol use: No    Drug use: No    Sexual activity: Yes     Partners: Female     Social Determinants of Health     Financial Resource Strain: Low Risk  (1/4/2024)    Overall Financial Resource Strain (CARDIA)     Difficulty of Paying Living Expenses: Not hard at all   Food Insecurity: No Food Insecurity (1/4/2024)    Hunger Vital Sign     Worried About Running Out of Food in the Last Year: Never true     Ran Out of Food in the Last Year: Never true   Transportation Needs: No Transportation Needs (1/4/2024)    PRAPARE - Transportation     Lack of  "Transportation (Medical): No     Lack of Transportation (Non-Medical): No   Physical Activity: Insufficiently Active (1/4/2024)    Exercise Vital Sign     Days of Exercise per Week: 2 days     Minutes of Exercise per Session: 20 min   Stress: No Stress Concern Present (1/4/2024)    [place] [place] [employer] - Occupational Stress Questionnaire     Feeling of Stress : Only a little   Housing Stability: Low Risk  (1/4/2024)    Housing Stability Vital Sign     Unable to Pay for Housing in the Last Year: No     Number of Places Lived in the Last Year: 1     Unstable Housing in the Last Year: No     Lab Results   Component Value Date    WBC 5.6 12/30/2022    HGB 14.5 12/30/2022    HCT 43.9 12/30/2022    MCV 86.2 12/30/2022     12/30/2022    CHOL 207 (H) 12/30/2022    TRIG 111 12/30/2022    HDL 55 12/30/2022    ALT 16 12/30/2022    AST 17 12/30/2022    BILITOT 0.5 12/30/2022    ALKPHOS 66 08/31/2020     12/30/2022    K 4.1 12/30/2022     12/30/2022    CREATININE 1.28 12/30/2022    ESTGFRAFRICA >60.0 08/31/2020    EGFRNONAA 56.9 (A) 08/31/2020    EGFRNORACEVR 61 12/30/2022    CALCIUM 9.0 12/30/2022    ALBUMIN 4.2 12/30/2022    BUN 17 12/30/2022    CO2 27 12/30/2022    TSH 3.33 12/30/2022    PSA 2.7 02/27/2018    PSADIAG 2.12 12/30/2022    LDLCALC 130 (H) 12/30/2022    GLU 87 12/30/2022                       Vital signs reviewed  Vitals:    05/20/24 1412   BP: 114/78   BP Location: Right arm   Patient Position: Sitting   BP Method: Medium (Manual)   Pulse: 74   Temp: 98.8 °F (37.1 °C)   TempSrc: Oral   SpO2: 98%   Weight: 88.6 kg (195 lb 5.2 oz)   Height: 6' 1" (1.854 m)         Wt Readings from Last 4 Encounters:   05/20/24 88.6 kg (195 lb 5.2 oz)   01/11/24 86 kg (189 lb 8 oz)   03/24/23 88.8 kg (195 lb 12.3 oz)   01/27/23 89.7 kg (197 lb 12 oz)       PE:   APPEARANCE: Well nourished, well developed, in no acute distress.    HEAD: Normocephalic, atraumatic.  EYES: PERRL. EOMI.   " Conjunctivae noninjected.  EARS: TM's intact. Light reflex normal. No retraction or perforation.    NOSE: Mucosa pink. Airway clear.  MOUTH & THROAT: No tonsillar enlargement. No pharyngeal erythema or exudate.   NECK: Supple with no cervical lymphadenopathy.  No carotid bruits.  No thyromegaly  CHEST: Good inspiratory effort. Lungs clear to auscultation with no wheezes or crackles.  CARDIOVASCULAR: Normal S1, S2. No rubs, murmurs, or gallops.  ABDOMEN: Bowel sounds normal. Not distended. Soft. No tenderness or masses. No organomegaly.  EXTREMITIES:  no edema  MSK: Right hip: Normal internal and external rotation.  No sacroiliac tenderness, negative Rufina's test.  No trochanteric tenderness    IMPRESSION  1. Hyperlipidemia, unspecified hyperlipidemia type    2. JILLIAN (obstructive sleep apnea)    3. Insomnia, unspecified type    4. Benign prostatic hyperplasia without lower urinary tract symptoms    5. Aortic atherosclerosis    6. Routine general medical examination at a health care facility    7. Screening for malignant neoplasm of prostate    8. Fatigue, unspecified type    9. Other long term (current) drug therapy    10. Abnormal finding of blood chemistry, unspecified            PLAN  Labs below, will include iron studies, D, a.m. total testosterone given his concerns about increased fatigue    Dyslipidemia: Continue atorvastatin     Continue plan for pelvic floor PT for fecal incontinence with urination only.  Up-to-date GI studies.  He is increasing fiber in his diet as well    Right hip pain, seeing ortho tomorrow regarding his right knee.  This hip pain could be as a function of his knee issues.  He can request x-ray through his orthopedics tomorrow.  States that his orthopedics is getting ready to leave practice to go out of town.  He eventually plans on transferring his care to [facility], can let us know what he needs a referral.        Orders Placed This Encounter   Procedures    CBC Auto Differential     Comprehensive Metabolic Panel    Lipid Panel    TSH    PSA, Screening    Vitamin D    Ferritin    Iron and TIBC    Testosterone          SCREENINGS  Colonoscopy was due back 2021 but opted for fit test .  Most recent fit was negative on 11/27/2023.  Had a colonoscopy March of 2024 showing mild diverticulosis    Prostate: sees urology, PSA  12/30/2022, updated with labs     Immunizations  Tdap 2016  Pneumococcal vaccine due although counseling provided today but patient decline  COVID eligible booster  Flu:, decline  shingles: dose 1 on 4/11/23, can get dose 2 at pharmacy

## 2024-05-21 ENCOUNTER — LAB VISIT (OUTPATIENT)
Dept: LAB | Facility: HOSPITAL | Age: 69
End: 2024-05-21
Attending: FAMILY MEDICINE
Payer: MEDICARE

## 2024-05-21 DIAGNOSIS — G47.00 INSOMNIA, UNSPECIFIED TYPE: ICD-10-CM

## 2024-05-21 DIAGNOSIS — Z79.899 OTHER LONG TERM (CURRENT) DRUG THERAPY: ICD-10-CM

## 2024-05-21 DIAGNOSIS — N40.0 BENIGN PROSTATIC HYPERPLASIA WITHOUT LOWER URINARY TRACT SYMPTOMS: ICD-10-CM

## 2024-05-21 DIAGNOSIS — G47.33 OSA (OBSTRUCTIVE SLEEP APNEA): ICD-10-CM

## 2024-05-21 DIAGNOSIS — I70.0 AORTIC ATHEROSCLEROSIS: ICD-10-CM

## 2024-05-21 DIAGNOSIS — R79.9 ABNORMAL FINDING OF BLOOD CHEMISTRY, UNSPECIFIED: ICD-10-CM

## 2024-05-21 DIAGNOSIS — Z00.00 ROUTINE GENERAL MEDICAL EXAMINATION AT A HEALTH CARE FACILITY: ICD-10-CM

## 2024-05-21 DIAGNOSIS — E78.5 HYPERLIPIDEMIA, UNSPECIFIED HYPERLIPIDEMIA TYPE: ICD-10-CM

## 2024-05-21 DIAGNOSIS — R53.83 FATIGUE, UNSPECIFIED TYPE: ICD-10-CM

## 2024-05-21 DIAGNOSIS — Z12.5 SCREENING FOR MALIGNANT NEOPLASM OF PROSTATE: ICD-10-CM

## 2024-05-21 LAB
25(OH)D3+25(OH)D2 SERPL-MCNC: 32 NG/ML (ref 30–96)
ALBUMIN SERPL BCP-MCNC: 3.8 G/DL (ref 3.5–5.2)
ALP SERPL-CCNC: 67 U/L (ref 55–135)
ALT SERPL W/O P-5'-P-CCNC: 26 U/L (ref 10–44)
ANION GAP SERPL CALC-SCNC: 11 MMOL/L (ref 8–16)
AST SERPL-CCNC: 25 U/L (ref 10–40)
BASOPHILS # BLD AUTO: 0.03 K/UL (ref 0–0.2)
BASOPHILS NFR BLD: 0.6 % (ref 0–1.9)
BILIRUB SERPL-MCNC: 0.4 MG/DL (ref 0.1–1)
BUN SERPL-MCNC: 22 MG/DL (ref 8–23)
CALCIUM SERPL-MCNC: 9.6 MG/DL (ref 8.7–10.5)
CHLORIDE SERPL-SCNC: 106 MMOL/L (ref 95–110)
CHOLEST SERPL-MCNC: 230 MG/DL (ref 120–199)
CHOLEST/HDLC SERPL: 4.3 {RATIO} (ref 2–5)
CO2 SERPL-SCNC: 24 MMOL/L (ref 23–29)
COMPLEXED PSA SERPL-MCNC: 2.2 NG/ML (ref 0–4)
CREAT SERPL-MCNC: 1.3 MG/DL (ref 0.5–1.4)
DIFFERENTIAL METHOD BLD: NORMAL
EOSINOPHIL # BLD AUTO: 0.4 K/UL (ref 0–0.5)
EOSINOPHIL NFR BLD: 7.9 % (ref 0–8)
ERYTHROCYTE [DISTWIDTH] IN BLOOD BY AUTOMATED COUNT: 13 % (ref 11.5–14.5)
EST. GFR  (NO RACE VARIABLE): 59 ML/MIN/1.73 M^2
FERRITIN SERPL-MCNC: 54 NG/ML (ref 20–300)
GLUCOSE SERPL-MCNC: 94 MG/DL (ref 70–110)
HCT VFR BLD AUTO: 45.7 % (ref 40–54)
HDLC SERPL-MCNC: 53 MG/DL (ref 40–75)
HDLC SERPL: 23 % (ref 20–50)
HGB BLD-MCNC: 15.5 G/DL (ref 14–18)
IMM GRANULOCYTES # BLD AUTO: 0.01 K/UL (ref 0–0.04)
IMM GRANULOCYTES NFR BLD AUTO: 0.2 % (ref 0–0.5)
IRON SERPL-MCNC: 73 UG/DL (ref 45–160)
LDLC SERPL CALC-MCNC: 160.2 MG/DL (ref 63–159)
LYMPHOCYTES # BLD AUTO: 1.6 K/UL (ref 1–4.8)
LYMPHOCYTES NFR BLD: 32.2 % (ref 18–48)
MCH RBC QN AUTO: 30.9 PG (ref 27–31)
MCHC RBC AUTO-ENTMCNC: 33.9 G/DL (ref 32–36)
MCV RBC AUTO: 91 FL (ref 82–98)
MONOCYTES # BLD AUTO: 0.5 K/UL (ref 0.3–1)
MONOCYTES NFR BLD: 9.4 % (ref 4–15)
NEUTROPHILS # BLD AUTO: 2.4 K/UL (ref 1.8–7.7)
NEUTROPHILS NFR BLD: 49.7 % (ref 38–73)
NONHDLC SERPL-MCNC: 177 MG/DL
NRBC BLD-RTO: 0 /100 WBC
PLATELET # BLD AUTO: 170 K/UL (ref 150–450)
PMV BLD AUTO: 11.3 FL (ref 9.2–12.9)
POTASSIUM SERPL-SCNC: 4.1 MMOL/L (ref 3.5–5.1)
PROT SERPL-MCNC: 7.4 G/DL (ref 6–8.4)
RBC # BLD AUTO: 5.02 M/UL (ref 4.6–6.2)
SATURATED IRON: 19 % (ref 20–50)
SODIUM SERPL-SCNC: 141 MMOL/L (ref 136–145)
TESTOST SERPL-MCNC: 594 NG/DL (ref 304–1227)
TOTAL IRON BINDING CAPACITY: 386 UG/DL (ref 250–450)
TRANSFERRIN SERPL-MCNC: 261 MG/DL (ref 200–375)
TRIGL SERPL-MCNC: 84 MG/DL (ref 30–150)
TSH SERPL DL<=0.005 MIU/L-ACNC: 2.93 UIU/ML (ref 0.4–4)
WBC # BLD AUTO: 4.91 K/UL (ref 3.9–12.7)

## 2024-05-21 PROCEDURE — 80061 LIPID PANEL: CPT | Performed by: FAMILY MEDICINE

## 2024-05-21 PROCEDURE — 84153 ASSAY OF PSA TOTAL: CPT | Performed by: FAMILY MEDICINE

## 2024-05-21 PROCEDURE — 82728 ASSAY OF FERRITIN: CPT | Performed by: FAMILY MEDICINE

## 2024-05-21 PROCEDURE — 36415 COLL VENOUS BLD VENIPUNCTURE: CPT | Performed by: FAMILY MEDICINE

## 2024-05-21 PROCEDURE — 80053 COMPREHEN METABOLIC PANEL: CPT | Performed by: FAMILY MEDICINE

## 2024-05-21 PROCEDURE — 83540 ASSAY OF IRON: CPT | Performed by: FAMILY MEDICINE

## 2024-05-21 PROCEDURE — 82306 VITAMIN D 25 HYDROXY: CPT | Performed by: FAMILY MEDICINE

## 2024-05-21 PROCEDURE — 84443 ASSAY THYROID STIM HORMONE: CPT | Performed by: FAMILY MEDICINE

## 2024-05-21 PROCEDURE — 85025 COMPLETE CBC W/AUTO DIFF WBC: CPT | Performed by: FAMILY MEDICINE

## 2024-05-21 PROCEDURE — 84403 ASSAY OF TOTAL TESTOSTERONE: CPT | Performed by: FAMILY MEDICINE

## 2024-05-29 ENCOUNTER — OFFICE VISIT (OUTPATIENT)
Dept: PODIATRY | Facility: CLINIC | Age: 69
End: 2024-05-29
Payer: MEDICARE

## 2024-05-29 VITALS — BODY MASS INDEX: 25.88 KG/M2 | HEIGHT: 73 IN | WEIGHT: 195.31 LBS

## 2024-05-29 DIAGNOSIS — M21.70 LOWER LIMB LENGTH DIFFERENCE: Primary | ICD-10-CM

## 2024-05-29 DIAGNOSIS — M77.42 METATARSALGIA OF BOTH FEET: ICD-10-CM

## 2024-05-29 DIAGNOSIS — M62.469 GASTROCNEMIUS EQUINUS, UNSPECIFIED LATERALITY: ICD-10-CM

## 2024-05-29 DIAGNOSIS — M77.41 METATARSALGIA OF BOTH FEET: ICD-10-CM

## 2024-05-29 DIAGNOSIS — M25.871 SESAMOIDITIS OF RIGHT FOOT: ICD-10-CM

## 2024-05-29 PROCEDURE — 99999 PR PBB SHADOW E&M-EST. PATIENT-LVL III: CPT | Mod: PBBFAC,,, | Performed by: PODIATRIST

## 2024-05-29 PROCEDURE — 3008F BODY MASS INDEX DOCD: CPT | Mod: CPTII,S$GLB,, | Performed by: PODIATRIST

## 2024-05-29 PROCEDURE — 1101F PT FALLS ASSESS-DOCD LE1/YR: CPT | Mod: CPTII,S$GLB,, | Performed by: PODIATRIST

## 2024-05-29 PROCEDURE — 3288F FALL RISK ASSESSMENT DOCD: CPT | Mod: CPTII,S$GLB,, | Performed by: PODIATRIST

## 2024-05-29 PROCEDURE — 1125F AMNT PAIN NOTED PAIN PRSNT: CPT | Mod: CPTII,S$GLB,, | Performed by: PODIATRIST

## 2024-05-29 PROCEDURE — 1159F MED LIST DOCD IN RCRD: CPT | Mod: CPTII,S$GLB,, | Performed by: PODIATRIST

## 2024-05-29 PROCEDURE — 99203 OFFICE O/P NEW LOW 30 MIN: CPT | Mod: S$GLB,,, | Performed by: PODIATRIST

## 2024-05-29 RX ORDER — CELECOXIB 200 MG/1
200 CAPSULE ORAL
COMMUNITY
Start: 2024-05-22

## 2024-05-29 NOTE — PATIENT INSTRUCTIONS
- Perform stretching exercises, see handout for details, to address tightness of calf muscles.      - Recommend wearing supportive shoes only.  Avoid barefoot walking, flip flops, and Crocs, as this will exacerbate current symptoms.      - Recommend icing the affected areas a minimum of 20 minutes daily.    - Avoid high impact activities such as squatting, stooping, and running as these activities will exacerbate symptoms.      - May consider applying a topical analgesic (Voltaren cream) to help with pain symptoms.       - Recommend applying Ebanel lotion to the area of callus build up once daily.

## 2024-05-29 NOTE — PROGRESS NOTES
Subjective:      Patient ID: Melo Zuñiga is a 68 y.o. male.    Chief Complaint: Foot Pain    Melo is a 68 y.o. male with a past medical history of HLD and meniscal injury presents to clinic with the chief complaint of bilateral forefoot pain.  Patient notes wearing custom molded insoles for years, and this seems to have mitigated past pedal issues, however, suspects these are no longer effective at offloading his problem areas.  Notes specifically pain in the forefoot at the sub 1st - 3rd met heads on the Rt. And 2nd- 3rd met heads on the Lt.  Describes deep bone pain that he rates as a 4/10.  Symptoms are aggravated with all weight bearing and alleviated with rest.  Denies sustaining trauma to the affected limbs.  Has attempted to treat with wearing supportive shoe gear.  Denies any additional pedal complaints.      Past Medical History:   Diagnosis Date    Elevated hemidiaphragm     HLD (hyperlipidemia)     Meniscal injury        Past Surgical History:   Procedure Laterality Date    COLONOSCOPY      NO PAST SURGERIES         Family History   Problem Relation Name Age of Onset    Skin cancer Father      Diabetes Neg Hx      Cancer Neg Hx      Coronary artery disease Neg Hx      Melanoma Neg Hx         Social History     Socioeconomic History    Marital status: Single   Tobacco Use    Smoking status: Never    Smokeless tobacco: Never   Substance and Sexual Activity    Alcohol use: No    Drug use: No    Sexual activity: Yes     Partners: Female     Social Determinants of Health     Financial Resource Strain: Low Risk  (1/4/2024)    Overall Financial Resource Strain (CARDIA)     Difficulty of Paying Living Expenses: Not hard at all   Food Insecurity: No Food Insecurity (1/4/2024)    Hunger Vital Sign     Worried About Running Out of Food in the Last Year: Never true     Ran Out of Food in the Last Year: Never true   Transportation Needs: No Transportation Needs (1/4/2024)    PRAPARE - Transportation     Lack of  Transportation (Medical): No     Lack of Transportation (Non-Medical): No   Physical Activity: Insufficiently Active (1/4/2024)    Exercise Vital Sign     Days of Exercise per Week: 2 days     Minutes of Exercise per Session: 20 min   Stress: No Stress Concern Present (1/4/2024)    [place] [employer] - Occupational Stress Questionnaire     Feeling of Stress : Only a little   Housing Stability: Low Risk  (1/4/2024)    Housing Stability Vital Sign     Unable to Pay for Housing in the Last Year: No     Number of Places Lived in the Last Year: 1     Unstable Housing in the Last Year: No       Current Outpatient Medications   Medication Sig Dispense Refill    aspirin 81 MG Chew Take 81 mg by mouth once daily.      atorvastatin (LIPITOR) 20 MG tablet TAKE 1/2 TABLET EVERY OTHER DAY Strength: 20 mg (Patient taking differently: 10 mg. TAKE 1/2 20 mg TABLET EVERY OTHER DAY Strength: 20 mg) 90 tablet 3    calcium carbonate (OS-FELICIA) 500 mg calcium (1,250 mg) tablet Take 1 tablet by mouth once daily.      celecoxib (CELEBREX) 200 MG capsule Take 200 mg by mouth.      D3-E-Se-soy fogdj-aumulb-qbgzf (PROSTATE 2.4) 1,200-15-35 unit-unit-mcg Cap Take by mouth.      ferrous sulfate (FEOSOL) Tab tablet Take 1 tablet by mouth daily with breakfast.      garlic 1,000 mg Cap Take by mouth.      omega-3 fatty acids/fish oil (FISH OIL-OMEGA-3 FATTY ACIDS) 300-1,000 mg capsule Take by mouth once daily.      prasterone, dhea, (DHEA) 50 mg Cap Take by mouth.      traZODone (DESYREL) 50 MG tablet Take 1 tablet (50 mg total) by mouth every evening. (Patient taking differently: Take 50 mg by mouth every evening. PRN) 90 tablet 3    zinc gluconate 50 mg tablet Take 50 mg by mouth once daily.      ergocalciferol (VITAMIN D2) 50,000 unit Cap Take 50,000 Units by mouth every 7 days.      folic acid (FOLVITE) 800 MCG Tab Take 800 mcg by mouth once daily.       No current facility-administered medications for this visit.        Review of patient's allergies indicates:  No Known Allergies     Review of Systems   Constitutional: Negative for chills and fever.   Cardiovascular:  Negative for claudication and leg swelling.   Respiratory:  Negative for shortness of breath.    Skin:  Negative for itching, nail changes and rash.   Musculoskeletal:  Positive for arthritis and joint pain. Negative for muscle cramps, muscle weakness and myalgias.   Gastrointestinal:  Negative for nausea and vomiting.   Neurological:  Negative for focal weakness, loss of balance, numbness and paresthesias.           Objective:      Physical Exam  Constitutional:       General: He is not in acute distress.     Appearance: He is well-developed. He is not diaphoretic.   Cardiovascular:      Pulses:           Dorsalis pedis pulses are 2+ on the right side and 2+ on the left side.        Posterior tibial pulses are 2+ on the right side and 2+ on the left side.      Comments: < 3 sec capillary refill time to toes 1-5 bilateral. Toes and feet are warm to touch proximally with normal distal cooling b/l. There is some hair growth on the feet and toes b/l. There is no edema b/l. No spider veins or varicosities present b/l.     Musculoskeletal:         General: Tenderness present. No signs of injury.      Comments: Muscle strength 5/5 in all muscle groups bilateral.  No tenderness nor crepitation with ROM of foot/ankle joints bilateral.  Bilateral gastrocnemius equinus.  Pain with palpation to the sesamoid complex of the Rt. Foot.  Pain with palpation to bilateral sub 2nd-3rd met heads.  (-) Lachman sign bilateral.     Skin:     General: Skin is warm and dry.      Coloration: Skin is not pale.      Findings: No abrasion, bruising, burn, ecchymosis, erythema, laceration, lesion, petechiae or rash.      Nails: There is no clubbing.      Comments: Skin temperature, texture and turgor within normal limits.   Neurological:      Mental Status: He is alert and oriented to  person, place, and time.      Sensory: No sensory deficit.      Motor: No tremor, atrophy or abnormal muscle tone.      Comments: Negative tinel sign bilateral.   Psychiatric:         Behavior: Behavior normal.               Assessment:       Encounter Diagnoses   Name Primary?    Lower limb length difference Yes    Sesamoiditis of right foot     Gastrocnemius equinus, unspecified laterality     Metatarsalgia of both feet          Plan:       Melo was seen today for foot pain.    Diagnoses and all orders for this visit:    Lower limb length difference  -     ORTHOTIC DEVICE (DME)    Sesamoiditis of right foot  -     ORTHOTIC DEVICE (DME)    Gastrocnemius equinus, unspecified laterality    Metatarsalgia of both feet  -     ORTHOTIC DEVICE (DME)      I counseled the patient on his conditions, their implications and medical management.    Based on today's exam, the patient has a lower limb length discrepancy with the Rt. Side being roughly a 1/2 inch shorter than the contralateral limb.  He also has bilateral metatarsalgia in addition to Rt. Sided sesamoiditis.      Advised to ice these areas up to 20 minutes daily.    To continue applying voltaren cream to the sites of pain 2-4 times daily.    Patient to avoid barefoot walking, crocs, and flip flops as this will aggravate symptoms.    Recommend continued use of supportive shoe gear.    Rx written for orthotics with a 1/4 inch heel lift on the Rt., bilateral metatarsal pads, and a Rt. 1st ray cut out.    Given information regarding a vendor who provides said service.      RTC two weeks after obtaining said device to see if further modification is necessary.    Ricardo Cody DPM

## 2024-11-19 ENCOUNTER — TELEPHONE (OUTPATIENT)
Dept: CARDIOLOGY | Facility: CLINIC | Age: 69
End: 2024-11-19
Payer: MEDICARE

## 2024-11-19 NOTE — TELEPHONE ENCOUNTER
----- Message from Annamaria sent at 11/19/2024  9:24 AM CST -----  Type:  Sooner Apoointment Request    Caller is requesting a sooner appointment.  Caller declined first available appointment listed below.  Caller will not accept being placed on the waitlist and is requesting a message be sent to doctor.  Name of Caller: Pt  When is the first available appointment? 11/21  Symptoms: elevated heart rate  Would the patient rather a call back or a response via [facility]?   Best Call Back Number: [phone]  Additional Information: Pt was transferred to [facility] for further assistance per symptom screening.

## 2024-11-19 NOTE — TELEPHONE ENCOUNTER
I reached out to patient ,No answer, Line busy.  Patient already has an appointment for 11-21-24  Here in cardiology.    Annamaria Guerrero Staff  Caller: Unspecified (Today,  9:24 AM)  Type:  Sooner Apoointment Request    Caller is requesting a sooner appointment.  Caller declined first available appointment listed below.  Caller will not accept being placed on the waitlist and is requesting a message be sent to doctor.  Name of Caller: Pt  When is the first available appointment? 11/21  Symptoms: elevated heart rate  Would the patient rather a call back or a response via [facility]?  Best Call Back Number: [phone]  Additional Information: Pt was transferred to [facility] for further assistance per symptom screening.

## 2024-11-21 ENCOUNTER — OFFICE VISIT (OUTPATIENT)
Dept: CARDIOLOGY | Facility: CLINIC | Age: 69
End: 2024-11-21
Payer: MEDICARE

## 2024-11-21 VITALS
DIASTOLIC BLOOD PRESSURE: 91 MMHG | WEIGHT: 202 LBS | OXYGEN SATURATION: 96 % | HEIGHT: 73 IN | BODY MASS INDEX: 26.77 KG/M2 | SYSTOLIC BLOOD PRESSURE: 138 MMHG | HEART RATE: 68 BPM

## 2024-11-21 DIAGNOSIS — E78.5 HYPERLIPIDEMIA, UNSPECIFIED HYPERLIPIDEMIA TYPE: ICD-10-CM

## 2024-11-21 DIAGNOSIS — I48.19 PERSISTENT ATRIAL FIBRILLATION: Primary | ICD-10-CM

## 2024-11-21 DIAGNOSIS — R07.9 CHEST PAIN, UNSPECIFIED TYPE: Primary | ICD-10-CM

## 2024-11-21 DIAGNOSIS — I70.0 AORTIC ATHEROSCLEROSIS: ICD-10-CM

## 2024-11-21 PROCEDURE — 1159F MED LIST DOCD IN RCRD: CPT | Mod: CPTII,S$GLB,, | Performed by: INTERNAL MEDICINE

## 2024-11-21 PROCEDURE — 3080F DIAST BP >= 90 MM HG: CPT | Mod: CPTII,S$GLB,, | Performed by: INTERNAL MEDICINE

## 2024-11-21 PROCEDURE — 1101F PT FALLS ASSESS-DOCD LE1/YR: CPT | Mod: CPTII,S$GLB,, | Performed by: INTERNAL MEDICINE

## 2024-11-21 PROCEDURE — 3288F FALL RISK ASSESSMENT DOCD: CPT | Mod: CPTII,S$GLB,, | Performed by: INTERNAL MEDICINE

## 2024-11-21 PROCEDURE — 3008F BODY MASS INDEX DOCD: CPT | Mod: CPTII,S$GLB,, | Performed by: INTERNAL MEDICINE

## 2024-11-21 PROCEDURE — 3075F SYST BP GE 130 - 139MM HG: CPT | Mod: CPTII,S$GLB,, | Performed by: INTERNAL MEDICINE

## 2024-11-21 PROCEDURE — 1160F RVW MEDS BY RX/DR IN RCRD: CPT | Mod: CPTII,S$GLB,, | Performed by: INTERNAL MEDICINE

## 2024-11-21 PROCEDURE — 99204 OFFICE O/P NEW MOD 45 MIN: CPT | Mod: S$GLB,,, | Performed by: INTERNAL MEDICINE

## 2024-11-21 PROCEDURE — 99999 PR PBB SHADOW E&M-EST. PATIENT-LVL III: CPT | Mod: PBBFAC,,, | Performed by: INTERNAL MEDICINE

## 2024-11-21 RX ORDER — ATORVASTATIN CALCIUM 40 MG/1
40 TABLET, FILM COATED ORAL DAILY
Qty: 90 TABLET | Refills: 3 | Status: SHIPPED | OUTPATIENT
Start: 2024-11-21 | End: 2025-11-21
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
## 2024-11-26 ENCOUNTER — LAB VISIT (OUTPATIENT)
Dept: LAB | Facility: HOSPITAL | Age: 69
End: 2024-11-26
Attending: INTERNAL MEDICINE
Payer: MEDICARE

## 2024-11-26 DIAGNOSIS — Z01.810 PRE-OPERATIVE CARDIOVASCULAR EXAMINATION: ICD-10-CM

## 2024-11-26 LAB
ANION GAP SERPL CALC-SCNC: 8 MMOL/L (ref 8–16)
BASOPHILS # BLD AUTO: 0.04 K/UL (ref 0–0.2)
BASOPHILS NFR BLD: 0.7 % (ref 0–1.9)
BUN SERPL-MCNC: 18 MG/DL (ref 8–23)
CALCIUM SERPL-MCNC: 9.2 MG/DL (ref 8.7–10.5)
CHLORIDE SERPL-SCNC: 104 MMOL/L (ref 95–110)
CO2 SERPL-SCNC: 25 MMOL/L (ref 23–29)
CREAT SERPL-MCNC: 1.2 MG/DL (ref 0.5–1.4)
DIFFERENTIAL METHOD BLD: ABNORMAL
EOSINOPHIL # BLD AUTO: 0.2 K/UL (ref 0–0.5)
EOSINOPHIL NFR BLD: 3 % (ref 0–8)
ERYTHROCYTE [DISTWIDTH] IN BLOOD BY AUTOMATED COUNT: 13.4 % (ref 11.5–14.5)
EST. GFR  (NO RACE VARIABLE): >60 ML/MIN/1.73 M^2
GLUCOSE SERPL-MCNC: 87 MG/DL (ref 70–110)
HCT VFR BLD AUTO: 43.8 % (ref 40–54)
HGB BLD-MCNC: 15.1 G/DL (ref 14–18)
IMM GRANULOCYTES # BLD AUTO: 0.03 K/UL (ref 0–0.04)
IMM GRANULOCYTES NFR BLD AUTO: 0.5 % (ref 0–0.5)
LYMPHOCYTES # BLD AUTO: 1.7 K/UL (ref 1–4.8)
LYMPHOCYTES NFR BLD: 28.5 % (ref 18–48)
MCH RBC QN AUTO: 32 PG (ref 27–31)
MCHC RBC AUTO-ENTMCNC: 34.5 G/DL (ref 32–36)
MCV RBC AUTO: 93 FL (ref 82–98)
MONOCYTES # BLD AUTO: 0.6 K/UL (ref 0.3–1)
MONOCYTES NFR BLD: 10.6 % (ref 4–15)
NEUTROPHILS # BLD AUTO: 3.4 K/UL (ref 1.8–7.7)
NEUTROPHILS NFR BLD: 56.7 % (ref 38–73)
NRBC BLD-RTO: 0 /100 WBC
PLATELET # BLD AUTO: 177 K/UL (ref 150–450)
PMV BLD AUTO: 12.1 FL (ref 9.2–12.9)
POTASSIUM SERPL-SCNC: 4.3 MMOL/L (ref 3.5–5.1)
RBC # BLD AUTO: 4.72 M/UL (ref 4.6–6.2)
SODIUM SERPL-SCNC: 137 MMOL/L (ref 136–145)
WBC # BLD AUTO: 6.01 K/UL (ref 3.9–12.7)

## 2024-11-26 PROCEDURE — 85025 COMPLETE CBC W/AUTO DIFF WBC: CPT | Performed by: INTERNAL MEDICINE

## 2024-11-26 PROCEDURE — 36415 COLL VENOUS BLD VENIPUNCTURE: CPT | Mod: PO | Performed by: INTERNAL MEDICINE

## 2024-11-26 PROCEDURE — 80048 BASIC METABOLIC PNL TOTAL CA: CPT | Performed by: INTERNAL MEDICINE

## 2024-11-27 ENCOUNTER — HOSPITAL ENCOUNTER (OUTPATIENT)
Facility: HOSPITAL | Age: 69
Discharge: HOME | End: 2024-11-27
Attending: INTERNAL MEDICINE | Admitting: INTERNAL MEDICINE
Payer: MEDICARE

## 2024-11-27 ENCOUNTER — PATIENT MESSAGE (OUTPATIENT)
Dept: CARDIOLOGY | Facility: HOSPITAL | Age: 69
End: 2024-11-27
Payer: MEDICARE

## 2024-11-27 DIAGNOSIS — Z01.810 PRE-OPERATIVE CARDIOVASCULAR EXAMINATION: Primary | ICD-10-CM

## 2024-11-27 DIAGNOSIS — I48.19 PERSISTENT ATRIAL FIBRILLATION: ICD-10-CM

## 2024-11-27 DIAGNOSIS — Z01.818 PRE-OP EVALUATION: ICD-10-CM

## 2024-11-27 LAB
OHS QRS DURATION: 78 MS
OHS QTC CALCULATION: 416 MS

## 2024-11-27 PROCEDURE — 99499 UNLISTED E&M SERVICE: CPT | Mod: ,,, | Performed by: INTERNAL MEDICINE

## 2024-11-27 PROCEDURE — 93010 ELECTROCARDIOGRAM REPORT: CPT | Mod: ,,, | Performed by: INTERNAL MEDICINE

## 2024-11-27 PROCEDURE — 93005 ELECTROCARDIOGRAM TRACING: CPT

## 2024-11-27 NOTE — DISCHARGE SUMMARY
[facility] - Cath Lab (Hospital)  Discharge Note  Short Stay    Procedure(s) (LRB):  SHONNA/cardioversion canceled.  Repeat EKG is sinus rhythm    OUTCOME: Condition has improved and patient is now ready for discharge.  Procedure canceled.  Repeat EKG is sinus rhythm.  DISPOSITION: Home or Self Care    FINAL DIAGNOSIS:  <principal problem not specified>    FOLLOWUP: In clinic    DISCHARGE INSTRUCTIONS:    Discharge Procedure Orders   CBC W/ AUTO DIFFERENTIAL   Standing Status: Future Number of Occurrences: 1 Standing Exp. Date: 02/19/26     BASIC METABOLIC PANEL   Standing Status: Future Number of Occurrences: 1 Standing Exp. Date: 02/19/26     Order Specific Question Answer Comments   Send normal result to authorizing provider's In Basket if patient is active on MyChart: Yes         TIME SPENT ON DISCHARGE: 10 minutes

## 2024-11-27 NOTE — CARE UPDATE
Repeat EKG today showed sinus rhythm.  Siva/cardioversion canceled.  Patient will be discharged on Eliquis and follow up as an outpatient

## 2024-11-27 NOTE — PLAN OF CARE
EKG done at bedside.  Pt in NSR with no A fib detected.  MD notified and at bedside.  Procedure to be cancelled.

## 2024-12-05 ENCOUNTER — TELEPHONE (OUTPATIENT)
Dept: CARDIOLOGY | Facility: CLINIC | Age: 69
End: 2024-12-05
Payer: MEDICARE

## 2024-12-05 NOTE — TELEPHONE ENCOUNTER
----- Message from Nehemiah sent at 12/5/2024  3:50 PM CST -----  Type:  Call    Who Called:pt   Does the patient know what this is regarding?:would like to know if he still needs to do echo on 12/13  Would the patient rather a call back or a response via [facility]? Call   Best Call Back Number:[phone]  Additional Information:

## 2024-12-06 ENCOUNTER — PATIENT MESSAGE (OUTPATIENT)
Dept: FAMILY MEDICINE | Facility: CLINIC | Age: 69
End: 2024-12-06
Payer: MEDICARE

## 2024-12-06 ENCOUNTER — TELEPHONE (OUTPATIENT)
Dept: FAMILY MEDICINE | Facility: CLINIC | Age: 69
End: 2024-12-06
Payer: MEDICARE

## 2024-12-13 ENCOUNTER — HOSPITAL ENCOUNTER (OUTPATIENT)
Dept: CARDIOLOGY | Facility: HOSPITAL | Age: 69
Discharge: HOME | End: 2024-12-13
Attending: INTERNAL MEDICINE
Payer: MEDICARE

## 2024-12-13 VITALS — BODY MASS INDEX: 26.77 KG/M2 | HEIGHT: 73 IN | WEIGHT: 202 LBS

## 2024-12-13 DIAGNOSIS — I48.19 PERSISTENT ATRIAL FIBRILLATION: ICD-10-CM

## 2024-12-13 LAB
APICAL FOUR CHAMBER EJECTION FRACTION: 66 %
APICAL TWO CHAMBER EJECTION FRACTION: 58 %
ASCENDING AORTA: 2.96 CM
AV INDEX (PROSTH): 0.89
AV MEAN GRADIENT: 3 MMHG
AV PEAK GRADIENT: 4.8 MMHG
AV VALVE AREA BY VELOCITY RATIO: 2.9 CM²
AV VALVE AREA: 2.8 CM²
AV VELOCITY RATIO: 0.91
BSA FOR ECHO PROCEDURE: 2.17 M2
CV ECHO LV RWT: 0.56 CM
DOP CALC AO PEAK VEL: 1.1 M/S
DOP CALC AO VTI: 23.6 CM
DOP CALC LVOT AREA: 3.1 CM2
DOP CALC LVOT DIAMETER: 2 CM
DOP CALC LVOT PEAK VEL: 1 M/S
DOP CALC LVOT STROKE VOLUME: 65.9 CM3
DOP CALC MV VTI: 27.1 CM
DOP CALCLVOT PEAK VEL VTI: 21 CM
E WAVE DECELERATION TIME: 277.79 MSEC
E/A RATIO: 0.88
E/E' RATIO: 10.92 M/S
ECHO LV POSTERIOR WALL: 1 CM (ref 0.6–1.1)
FRACTIONAL SHORTENING: 41.7 % (ref 28–44)
INTERVENTRICULAR SEPTUM: 0.9 CM (ref 0.6–1.1)
IVC DIAMETER: 1.31 CM
LEFT ATRIUM AREA SYSTOLIC (APICAL 2 CHAMBER): 14.27 CM2
LEFT ATRIUM AREA SYSTOLIC (APICAL 4 CHAMBER): 15.28 CM2
LEFT ATRIUM VOLUME INDEX MOD: 18.8 ML/M2
LEFT ATRIUM VOLUME MOD: 40.64 ML
LEFT INTERNAL DIMENSION IN SYSTOLE: 2.1 CM (ref 2.1–4)
LEFT VENTRICLE DIASTOLIC VOLUME INDEX: 25.4 ML/M2
LEFT VENTRICLE DIASTOLIC VOLUME: 54.87 ML
LEFT VENTRICLE END DIASTOLIC VOLUME APICAL 2 CHAMBER: 43.71 ML
LEFT VENTRICLE END DIASTOLIC VOLUME APICAL 4 CHAMBER: 49.65 ML
LEFT VENTRICLE END SYSTOLIC VOLUME APICAL 2 CHAMBER: 37.51 ML
LEFT VENTRICLE END SYSTOLIC VOLUME APICAL 4 CHAMBER: 36.28 ML
LEFT VENTRICLE MASS INDEX: 46.4 G/M2
LEFT VENTRICLE SYSTOLIC VOLUME INDEX: 6.5 ML/M2
LEFT VENTRICLE SYSTOLIC VOLUME: 13.99 ML
LEFT VENTRICULAR INTERNAL DIMENSION IN DIASTOLE: 3.6 CM (ref 3.5–6)
LEFT VENTRICULAR MASS: 100.2 G
LV LATERAL E/E' RATIO: 14.2 M/S
LV SEPTAL E/E' RATIO: 8.88 M/S
LVED V (TEICH): 54.87 ML
LVES V (TEICH): 13.99 ML
LVOT MG: 1.98 MMHG
LVOT MV: 0.64 CM/S
MV MEAN GRADIENT: 1 MMHG
MV PEAK A VEL: 0.81 M/S
MV PEAK E VEL: 0.71 M/S
MV PEAK GRADIENT: 3 MMHG
MV STENOSIS PRESSURE HALF TIME: 80.56 MS
MV VALVE AREA BY CONTINUITY EQUATION: 2.43 CM2
MV VALVE AREA P 1/2 METHOD: 2.73 CM2
OHS CV RV/LV RATIO: 0.78 CM
OHS LV EJECTION FRACTION SIMPSONS BIPLANE MOD: 62 %
PISA MRMAX VEL: 5.28 M/S
PISA TR MAX VEL: 1.82 M/S
PULM VEIN S/D RATIO: 1.41
PV MV: 0.71 M/S
PV PEAK D VEL: 0.37 M/S
PV PEAK GRADIENT: 3 MMHG
PV PEAK S VEL: 0.52 M/S
PV PEAK VELOCITY: 0.91 M/S
RA VOL SYS: 20.23 ML
RIGHT ATRIAL AREA: 10.1 CM2
RIGHT ATRIUM VOLUME AREA LENGTH APICAL 4 CHAMBER: 18.29 ML
RIGHT VENTRICLE DIASTOLIC BASEL DIMENSION: 2.8 CM
RV TISSUE DOPPLER FREE WALL SYSTOLIC VELOCITY 1 (APICAL 4 CHAMBER VIEW): 13.05 CM/S
SINUS: 2.96 CM
STJ: 2.79 CM
TDI LATERAL: 0.05 M/S
TDI SEPTAL: 0.08 M/S
TDI: 0.07 M/S
TR MAX PG: 13 MMHG
TRICUSPID ANNULAR PLANE SYSTOLIC EXCURSION: 1.9 CM
Z-SCORE OF LEFT VENTRICULAR DIMENSION IN END DIASTOLE: -6.76
Z-SCORE OF LEFT VENTRICULAR DIMENSION IN END SYSTOLE: -5.68

## 2024-12-13 PROCEDURE — 93306 TTE W/DOPPLER COMPLETE: CPT | Mod: 26,,, | Performed by: STUDENT IN AN ORGANIZED HEALTH CARE EDUCATION/TRAINING PROGRAM

## 2024-12-13 PROCEDURE — 93306 TTE W/DOPPLER COMPLETE: CPT

## 2024-12-16 LAB
APICAL FOUR CHAMBER EJECTION FRACTION: 66 %
APICAL TWO CHAMBER EJECTION FRACTION: 58 %
ASCENDING AORTA: 2.96 CM
AV INDEX (PROSTH): 0.89
AV MEAN GRADIENT: 3 MMHG
AV PEAK GRADIENT: 4.8 MMHG
AV VALVE AREA BY VELOCITY RATIO: 2.9 CM²
AV VALVE AREA: 2.8 CM²
AV VELOCITY RATIO: 0.91
BSA FOR ECHO PROCEDURE: 2.17 M2
CV ECHO LV RWT: 0.56 CM
DOP CALC AO PEAK VEL: 1.1 M/S
DOP CALC AO VTI: 23.6 CM
DOP CALC LVOT AREA: 3.1 CM2
DOP CALC LVOT DIAMETER: 2 CM
DOP CALC LVOT PEAK VEL: 1 M/S
DOP CALC LVOT STROKE VOLUME: 65.9 CM3
DOP CALC MV VTI: 27.1 CM
DOP CALCLVOT PEAK VEL VTI: 21 CM
E WAVE DECELERATION TIME: 277.79 MSEC
E/A RATIO: 0.88
E/E' RATIO: 10.92 M/S
ECHO LV POSTERIOR WALL: 1 CM (ref 0.6–1.1)
FRACTIONAL SHORTENING: 41.7 % (ref 28–44)
INTERVENTRICULAR SEPTUM: 0.9 CM (ref 0.6–1.1)
IVC DIAMETER: 1.31 CM
LEFT ATRIUM AREA SYSTOLIC (APICAL 2 CHAMBER): 14.27 CM2
LEFT ATRIUM AREA SYSTOLIC (APICAL 4 CHAMBER): 15.28 CM2
LEFT ATRIUM VOLUME INDEX MOD: 18.8 ML/M2
LEFT ATRIUM VOLUME MOD: 40.64 ML
LEFT INTERNAL DIMENSION IN SYSTOLE: 2.1 CM (ref 2.1–4)
LEFT VENTRICLE DIASTOLIC VOLUME INDEX: 25.4 ML/M2
LEFT VENTRICLE DIASTOLIC VOLUME: 54.87 ML
LEFT VENTRICLE END DIASTOLIC VOLUME APICAL 2 CHAMBER: 43.71 ML
LEFT VENTRICLE END DIASTOLIC VOLUME APICAL 4 CHAMBER: 49.65 ML
LEFT VENTRICLE END SYSTOLIC VOLUME APICAL 2 CHAMBER: 37.51 ML
LEFT VENTRICLE END SYSTOLIC VOLUME APICAL 4 CHAMBER: 36.28 ML
LEFT VENTRICLE MASS INDEX: 46.4 G/M2
LEFT VENTRICLE SYSTOLIC VOLUME INDEX: 6.5 ML/M2
LEFT VENTRICLE SYSTOLIC VOLUME: 13.99 ML
LEFT VENTRICULAR INTERNAL DIMENSION IN DIASTOLE: 3.6 CM (ref 3.5–6)
LEFT VENTRICULAR MASS: 100.2 G
LV LATERAL E/E' RATIO: 14.2 M/S
LV SEPTAL E/E' RATIO: 8.88 M/S
LVED V (TEICH): 54.87 ML
LVES V (TEICH): 13.99 ML
LVOT MG: 1.98 MMHG
LVOT MV: 0.64 CM/S
MV MEAN GRADIENT: 1 MMHG
MV PEAK A VEL: 0.81 M/S
MV PEAK E VEL: 0.71 M/S
MV PEAK GRADIENT: 3 MMHG
MV STENOSIS PRESSURE HALF TIME: 80.56 MS
MV VALVE AREA BY CONTINUITY EQUATION: 2.43 CM2
MV VALVE AREA P 1/2 METHOD: 2.73 CM2
OHS CV RV/LV RATIO: 0.78 CM
OHS LV EJECTION FRACTION SIMPSONS BIPLANE MOD: 62 %
PISA MRMAX VEL: 5.28 M/S
PISA TR MAX VEL: 1.82 M/S
PULM VEIN S/D RATIO: 1.41
PV MV: 0.71 M/S
PV PEAK D VEL: 0.37 M/S
PV PEAK GRADIENT: 3 MMHG
PV PEAK S VEL: 0.52 M/S
PV PEAK VELOCITY: 0.91 M/S
RA PRESSURE ESTIMATED: 3 MMHG
RA VOL SYS: 20.23 ML
RIGHT ATRIAL AREA: 10.1 CM2
RIGHT ATRIUM VOLUME AREA LENGTH APICAL 4 CHAMBER: 18.29 ML
RIGHT VENTRICLE DIASTOLIC BASEL DIMENSION: 2.8 CM
RV TB RVSP: 5 MMHG
RV TISSUE DOPPLER FREE WALL SYSTOLIC VELOCITY 1 (APICAL 4 CHAMBER VIEW): 13.05 CM/S
SINUS: 2.96 CM
STJ: 2.79 CM
TDI LATERAL: 0.05 M/S
TDI SEPTAL: 0.08 M/S
TDI: 0.07 M/S
TR MAX PG: 13 MMHG
TRICUSPID ANNULAR PLANE SYSTOLIC EXCURSION: 1.9 CM
TV REST PULMONARY ARTERY PRESSURE: 16 MMHG
Z-SCORE OF LEFT VENTRICULAR DIMENSION IN END DIASTOLE: -6.76
Z-SCORE OF LEFT VENTRICULAR DIMENSION IN END SYSTOLE: -5.68

## 2024-12-17 ENCOUNTER — TELEPHONE (OUTPATIENT)
Dept: CARDIOLOGY | Facility: CLINIC | Age: 69
End: 2024-12-17
Payer: MEDICARE

## 2024-12-17 NOTE — PROGRESS NOTES
Your echocardiogram results is okay.  Normal heart pumping function.  Overall no major abnormalities.  We will discuss in detail during next appointment    Sincerely,  Tod Fischer MD.   Interventional Cardiologist  [facility], [place]

## 2024-12-17 NOTE — TELEPHONE ENCOUNTER
----- Message from Tod Fischer MD sent at 12/17/2024  7:30 AM CST -----  Your echocardiogram results is okay.  Normal heart pumping function.  Overall no major abnormalities.  We will discuss in detail during next appointment    Sincerely,  Tod Fischer MD.   Interventional Cardiologist  [facility], [place]

## 2025-01-10 ENCOUNTER — TELEPHONE (OUTPATIENT)
Dept: CARDIOLOGY | Facility: CLINIC | Age: 70
End: 2025-01-10
Payer: MEDICARE

## 2025-01-10 ENCOUNTER — PATIENT MESSAGE (OUTPATIENT)
Dept: CARDIOLOGY | Facility: CLINIC | Age: 70
End: 2025-01-10
Payer: MEDICARE

## 2025-01-10 NOTE — TELEPHONE ENCOUNTER
----- Message from Annamaria sent at 1/10/2025 10:07 AM CST -----  Type:  Needs Medical Advice    Who Called: Pt  Would the patient rather a call back or a response via [facility]? Call  Best Call Back Number:   [phone]  Additional Information: Pt would like to speak with someone in office regarding providers availability.  Attempted to reach department.

## 2025-01-10 NOTE — TELEPHONE ENCOUNTER
Dear ,  Reached out to patient and he stated Dr. Erwin Ya with [facility] is requesting an Updated note stating He (Pt)  is in good Health.    Thank You,    Nw

## 2025-01-12 NOTE — TELEPHONE ENCOUNTER
Pt has no active cardiac condition (ACS/USA, decompenstated CHF, significant arrhythmias or severe valvular disease). METS >4. Patient has acceptable risk for moderate risk non cardiovascular surgery. No further cardiac work up required prior to  undergoing the surgical procedure.  These recommendations follow the 2014 ACC/AHA Guideline on Perioperative Cardiovascular Evaluation and Management of Patients Undergoing Noncardiac Surgery. (JACC Vol. 64 No. 22, Dec 9, 2014, pp b50-o708). Ok to hold Eliquis for 2-3 days pre op and restart post. Ok to hold Asirin for 3-5 days pre op and restart post     Sincerely,  Tod Fischer MD.   Interventional Cardiologist  [facility], [place]

## 2025-01-13 ENCOUNTER — PATIENT MESSAGE (OUTPATIENT)
Dept: CARDIOLOGY | Facility: CLINIC | Age: 70
End: 2025-01-13
Payer: MEDICARE

## 2025-02-11 ENCOUNTER — OFFICE VISIT (OUTPATIENT)
Dept: CARDIOLOGY | Facility: CLINIC | Age: 70
End: 2025-02-11
Payer: MEDICARE

## 2025-02-11 VITALS
HEART RATE: 64 BPM | SYSTOLIC BLOOD PRESSURE: 116 MMHG | HEIGHT: 73 IN | BODY MASS INDEX: 26.24 KG/M2 | WEIGHT: 198 LBS | DIASTOLIC BLOOD PRESSURE: 79 MMHG

## 2025-02-11 DIAGNOSIS — I70.0 AORTIC ATHEROSCLEROSIS: Primary | ICD-10-CM

## 2025-02-11 DIAGNOSIS — I48.0 PAF (PAROXYSMAL ATRIAL FIBRILLATION): Chronic | ICD-10-CM

## 2025-02-11 DIAGNOSIS — E78.2 MIXED HYPERLIPIDEMIA: ICD-10-CM

## 2025-02-11 PROCEDURE — 3008F BODY MASS INDEX DOCD: CPT | Mod: CPTII,S$GLB,, | Performed by: INTERNAL MEDICINE

## 2025-02-11 PROCEDURE — 3074F SYST BP LT 130 MM HG: CPT | Mod: CPTII,S$GLB,, | Performed by: INTERNAL MEDICINE

## 2025-02-11 PROCEDURE — 99214 OFFICE O/P EST MOD 30 MIN: CPT | Mod: S$GLB,,, | Performed by: INTERNAL MEDICINE

## 2025-02-11 PROCEDURE — 1101F PT FALLS ASSESS-DOCD LE1/YR: CPT | Mod: CPTII,S$GLB,, | Performed by: INTERNAL MEDICINE

## 2025-02-11 PROCEDURE — 3288F FALL RISK ASSESSMENT DOCD: CPT | Mod: CPTII,S$GLB,, | Performed by: INTERNAL MEDICINE

## 2025-02-11 PROCEDURE — 99999 PR PBB SHADOW E&M-EST. PATIENT-LVL III: CPT | Mod: PBBFAC,,, | Performed by: INTERNAL MEDICINE

## 2025-02-11 PROCEDURE — 3078F DIAST BP <80 MM HG: CPT | Mod: CPTII,S$GLB,, | Performed by: INTERNAL MEDICINE

## 2025-02-11 PROCEDURE — 1159F MED LIST DOCD IN RCRD: CPT | Mod: CPTII,S$GLB,, | Performed by: INTERNAL MEDICINE
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
## 2025-02-12 ENCOUNTER — PATIENT MESSAGE (OUTPATIENT)
Dept: CARDIOLOGY | Facility: CLINIC | Age: 70
End: 2025-02-12
Payer: MEDICARE

## 2025-06-04 DIAGNOSIS — I48.19 PERSISTENT ATRIAL FIBRILLATION: ICD-10-CM

## 2025-06-04 RX ORDER — APIXABAN 5 MG/1
5 TABLET, FILM COATED ORAL 2 TIMES DAILY
Qty: 60 TABLET | Refills: 6 | Status: SHIPPED | OUTPATIENT
Start: 2025-06-04

## 2025-06-06 ENCOUNTER — PATIENT MESSAGE (OUTPATIENT)
Dept: FAMILY MEDICINE | Facility: CLINIC | Age: 70
End: 2025-06-06
Payer: MEDICARE

## 2025-06-06 ENCOUNTER — PATIENT MESSAGE (OUTPATIENT)
Dept: CARDIOLOGY | Facility: CLINIC | Age: 70
End: 2025-06-06
Payer: MEDICARE

## 2025-06-10 ENCOUNTER — TELEPHONE (OUTPATIENT)
Dept: CARDIOLOGY | Facility: CLINIC | Age: 70
End: 2025-06-10
Payer: MEDICARE

## 2025-06-10 DIAGNOSIS — R94.31 NONSPECIFIC ABNORMAL ELECTROCARDIOGRAM (ECG) (EKG): Primary | ICD-10-CM

## 2025-06-13 ENCOUNTER — HOSPITAL ENCOUNTER (OUTPATIENT)
Dept: CARDIOLOGY | Facility: CLINIC | Age: 70
Discharge: HOME | End: 2025-06-13
Payer: MEDICARE

## 2025-06-13 ENCOUNTER — LAB VISIT (OUTPATIENT)
Dept: LAB | Facility: HOSPITAL | Age: 70
End: 2025-06-13
Attending: INTERNAL MEDICINE
Payer: MEDICARE

## 2025-06-13 ENCOUNTER — OFFICE VISIT (OUTPATIENT)
Dept: CARDIOLOGY | Facility: CLINIC | Age: 70
End: 2025-06-13
Payer: MEDICARE

## 2025-06-13 VITALS
WEIGHT: 200.38 LBS | OXYGEN SATURATION: 98 % | HEIGHT: 73 IN | DIASTOLIC BLOOD PRESSURE: 82 MMHG | SYSTOLIC BLOOD PRESSURE: 121 MMHG | BODY MASS INDEX: 26.56 KG/M2 | HEART RATE: 54 BPM

## 2025-06-13 DIAGNOSIS — J98.6 ELEVATED HEMIDIAPHRAGM: ICD-10-CM

## 2025-06-13 DIAGNOSIS — I48.0 PAF (PAROXYSMAL ATRIAL FIBRILLATION): Primary | Chronic | ICD-10-CM

## 2025-06-13 DIAGNOSIS — E78.5 HYPERLIPIDEMIA, UNSPECIFIED HYPERLIPIDEMIA TYPE: ICD-10-CM

## 2025-06-13 DIAGNOSIS — I70.0 AORTIC ATHEROSCLEROSIS: ICD-10-CM

## 2025-06-13 DIAGNOSIS — R94.31 NONSPECIFIC ABNORMAL ELECTROCARDIOGRAM (ECG) (EKG): ICD-10-CM

## 2025-06-13 DIAGNOSIS — G47.33 OSA (OBSTRUCTIVE SLEEP APNEA): ICD-10-CM

## 2025-06-13 LAB
CHOLEST SERPL-MCNC: 166 MG/DL (ref 120–199)
CHOLEST/HDLC SERPL: 3.2 {RATIO} (ref 2–5)
HDLC SERPL-MCNC: 52 MG/DL (ref 40–75)
HDLC SERPL: 31.3 % (ref 20–50)
LDLC SERPL CALC-MCNC: 98 MG/DL (ref 63–159)
NONHDLC SERPL-MCNC: 114 MG/DL
OHS QRS DURATION: 84 MS
OHS QTC CALCULATION: 431 MS
TRIGL SERPL-MCNC: 80 MG/DL (ref 30–150)

## 2025-06-13 PROCEDURE — 93010 ELECTROCARDIOGRAM REPORT: CPT | Mod: S$GLB,,, | Performed by: INTERNAL MEDICINE

## 2025-06-13 PROCEDURE — 82465 ASSAY BLD/SERUM CHOLESTEROL: CPT

## 2025-06-13 PROCEDURE — 99999 PR PBB SHADOW E&M-EST. PATIENT-LVL IV: CPT | Mod: PBBFAC,,, | Performed by: INTERNAL MEDICINE

## 2025-06-13 PROCEDURE — 36415 COLL VENOUS BLD VENIPUNCTURE: CPT

## 2025-06-13 RX ORDER — GABAPENTIN 300 MG/1
300 CAPSULE ORAL 3 TIMES DAILY
COMMUNITY
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
## 2025-06-13 NOTE — Clinical Note
Your patient, Melo Zuñiga , referred himself for a 2nd opinion on his evaluation of paroxysmal atrial fibrillation. Please see my note for details of this encounter. If you have any questions, please contact me.  Thank you for allowing me to participate in care of this patient.

## 2025-06-17 ENCOUNTER — RESULTS FOLLOW-UP (OUTPATIENT)
Dept: CARDIOLOGY | Facility: CLINIC | Age: 70
End: 2025-06-17

## 2025-06-18 ENCOUNTER — PATIENT OUTREACH (OUTPATIENT)
Dept: CARDIOLOGY | Facility: CLINIC | Age: 70
End: 2025-06-18
Payer: MEDICARE

## 2025-06-24 ENCOUNTER — PATIENT MESSAGE (OUTPATIENT)
Dept: FAMILY MEDICINE | Facility: CLINIC | Age: 70
End: 2025-06-24
Payer: MEDICARE

## 2025-07-14 ENCOUNTER — CLINICAL SUPPORT (OUTPATIENT)
Dept: CARDIOLOGY | Facility: HOSPITAL | Age: 70
End: 2025-07-14
Attending: INTERNAL MEDICINE
Payer: MEDICARE

## 2025-07-14 DIAGNOSIS — I48.0 PAF (PAROXYSMAL ATRIAL FIBRILLATION): Chronic | ICD-10-CM

## 2025-07-14 PROCEDURE — 93225 XTRNL ECG REC<48 HRS REC: CPT

## 2025-07-24 ENCOUNTER — OFFICE VISIT (OUTPATIENT)
Dept: FAMILY MEDICINE | Facility: CLINIC | Age: 70
End: 2025-07-24
Attending: FAMILY MEDICINE
Payer: MEDICARE

## 2025-07-24 VITALS
HEIGHT: 73 IN | WEIGHT: 195.31 LBS | BODY MASS INDEX: 25.88 KG/M2 | SYSTOLIC BLOOD PRESSURE: 100 MMHG | OXYGEN SATURATION: 96 % | HEART RATE: 62 BPM | DIASTOLIC BLOOD PRESSURE: 62 MMHG | TEMPERATURE: 99 F

## 2025-07-24 DIAGNOSIS — Z12.5 ENCOUNTER FOR SCREENING FOR MALIGNANT NEOPLASM OF PROSTATE: ICD-10-CM

## 2025-07-24 DIAGNOSIS — I48.0 PAF (PAROXYSMAL ATRIAL FIBRILLATION): Chronic | ICD-10-CM

## 2025-07-24 DIAGNOSIS — E78.5 HYPERLIPIDEMIA, UNSPECIFIED HYPERLIPIDEMIA TYPE: ICD-10-CM

## 2025-07-24 DIAGNOSIS — Z79.899 OTHER LONG TERM (CURRENT) DRUG THERAPY: ICD-10-CM

## 2025-07-24 DIAGNOSIS — Z00.00 ROUTINE GENERAL MEDICAL EXAMINATION AT A HEALTH CARE FACILITY: Primary | ICD-10-CM

## 2025-07-24 DIAGNOSIS — I70.0 AORTIC ATHEROSCLEROSIS: ICD-10-CM

## 2025-07-24 PROCEDURE — 3074F SYST BP LT 130 MM HG: CPT | Mod: CPTII,S$GLB,, | Performed by: FAMILY MEDICINE

## 2025-07-24 PROCEDURE — 1126F AMNT PAIN NOTED NONE PRSNT: CPT | Mod: CPTII,S$GLB,, | Performed by: FAMILY MEDICINE

## 2025-07-24 PROCEDURE — 99999 PR PBB SHADOW E&M-EST. PATIENT-LVL V: CPT | Mod: PBBFAC,,, | Performed by: FAMILY MEDICINE

## 2025-07-24 PROCEDURE — 3288F FALL RISK ASSESSMENT DOCD: CPT | Mod: CPTII,S$GLB,, | Performed by: FAMILY MEDICINE

## 2025-07-24 PROCEDURE — 3078F DIAST BP <80 MM HG: CPT | Mod: CPTII,S$GLB,, | Performed by: FAMILY MEDICINE

## 2025-07-24 PROCEDURE — 1101F PT FALLS ASSESS-DOCD LE1/YR: CPT | Mod: CPTII,S$GLB,, | Performed by: FAMILY MEDICINE

## 2025-07-24 PROCEDURE — 1160F RVW MEDS BY RX/DR IN RCRD: CPT | Mod: CPTII,S$GLB,, | Performed by: FAMILY MEDICINE

## 2025-07-24 PROCEDURE — 99397 PER PM REEVAL EST PAT 65+ YR: CPT | Mod: S$GLB,,, | Performed by: FAMILY MEDICINE

## 2025-07-24 PROCEDURE — 3008F BODY MASS INDEX DOCD: CPT | Mod: CPTII,S$GLB,, | Performed by: FAMILY MEDICINE

## 2025-07-24 PROCEDURE — 1159F MED LIST DOCD IN RCRD: CPT | Mod: CPTII,S$GLB,, | Performed by: FAMILY MEDICINE
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
## 2025-07-25 ENCOUNTER — LAB VISIT (OUTPATIENT)
Dept: LAB | Facility: HOSPITAL | Age: 70
End: 2025-07-25
Attending: FAMILY MEDICINE
Payer: MEDICARE

## 2025-07-25 DIAGNOSIS — Z12.5 ENCOUNTER FOR SCREENING FOR MALIGNANT NEOPLASM OF PROSTATE: ICD-10-CM

## 2025-07-25 DIAGNOSIS — Z79.899 OTHER LONG TERM (CURRENT) DRUG THERAPY: ICD-10-CM

## 2025-07-25 DIAGNOSIS — Z00.00 ROUTINE GENERAL MEDICAL EXAMINATION AT A HEALTH CARE FACILITY: ICD-10-CM

## 2025-07-25 DIAGNOSIS — I70.0 AORTIC ATHEROSCLEROSIS: ICD-10-CM

## 2025-07-25 DIAGNOSIS — E78.5 HYPERLIPIDEMIA, UNSPECIFIED HYPERLIPIDEMIA TYPE: ICD-10-CM

## 2025-07-25 DIAGNOSIS — I48.0 PAF (PAROXYSMAL ATRIAL FIBRILLATION): ICD-10-CM

## 2025-07-25 LAB
ABSOLUTE EOSINOPHIL (OHS): 0.24 K/UL
ABSOLUTE MONOCYTE (OHS): 0.34 K/UL (ref 0.3–1)
ABSOLUTE NEUTROPHIL COUNT (OHS): 2.67 K/UL (ref 1.8–7.7)
ALBUMIN SERPL BCP-MCNC: 3.9 G/DL (ref 3.5–5.2)
ALP SERPL-CCNC: 73 UNIT/L (ref 40–150)
ALT SERPL W/O P-5'-P-CCNC: 19 UNIT/L (ref 10–44)
ANION GAP (OHS): 7 MMOL/L (ref 8–16)
AST SERPL-CCNC: 21 UNIT/L (ref 11–45)
BASOPHILS # BLD AUTO: 0.04 K/UL
BASOPHILS NFR BLD AUTO: 0.9 %
BILIRUB SERPL-MCNC: 0.7 MG/DL (ref 0.1–1)
BUN SERPL-MCNC: 22 MG/DL (ref 8–23)
CALCIUM SERPL-MCNC: 9 MG/DL (ref 8.7–10.5)
CHLORIDE SERPL-SCNC: 107 MMOL/L (ref 95–110)
CHOLEST SERPL-MCNC: 154 MG/DL (ref 120–199)
CHOLEST/HDLC SERPL: 3.3 {RATIO} (ref 2–5)
CO2 SERPL-SCNC: 25 MMOL/L (ref 23–29)
CREAT SERPL-MCNC: 1.2 MG/DL (ref 0.5–1.4)
EAG (OHS): 120 MG/DL (ref 68–131)
ERYTHROCYTE [DISTWIDTH] IN BLOOD BY AUTOMATED COUNT: 13 % (ref 11.5–14.5)
GFR SERPLBLD CREATININE-BSD FMLA CKD-EPI: >60 ML/MIN/1.73/M2
GLUCOSE SERPL-MCNC: 97 MG/DL (ref 70–110)
HBA1C MFR BLD: 5.8 % (ref 4–5.6)
HCT VFR BLD AUTO: 42.1 % (ref 40–54)
HDLC SERPL-MCNC: 47 MG/DL (ref 40–75)
HDLC SERPL: 30.5 % (ref 20–50)
HGB BLD-MCNC: 14.1 GM/DL (ref 14–18)
IMM GRANULOCYTES # BLD AUTO: 0.01 K/UL (ref 0–0.04)
IMM GRANULOCYTES NFR BLD AUTO: 0.2 % (ref 0–0.5)
LDLC SERPL CALC-MCNC: 96.4 MG/DL (ref 63–159)
LYMPHOCYTES # BLD AUTO: 1.07 K/UL (ref 1–4.8)
MCH RBC QN AUTO: 30.5 PG (ref 27–31)
MCHC RBC AUTO-ENTMCNC: 33.5 G/DL (ref 32–36)
MCV RBC AUTO: 91 FL (ref 82–98)
NONHDLC SERPL-MCNC: 107 MG/DL
NUCLEATED RBC (/100WBC) (OHS): 0 /100 WBC
PLATELET # BLD AUTO: 153 K/UL (ref 150–450)
PMV BLD AUTO: 11.2 FL (ref 9.2–12.9)
POTASSIUM SERPL-SCNC: 4.2 MMOL/L (ref 3.5–5.1)
PROT SERPL-MCNC: 6.9 GM/DL (ref 6–8.4)
PSA SERPL-MCNC: 3.11 NG/ML
RBC # BLD AUTO: 4.62 M/UL (ref 4.6–6.2)
RELATIVE EOSINOPHIL (OHS): 5.5 %
RELATIVE LYMPHOCYTE (OHS): 24.5 % (ref 18–48)
RELATIVE MONOCYTE (OHS): 7.8 % (ref 4–15)
RELATIVE NEUTROPHIL (OHS): 61.1 % (ref 38–73)
SODIUM SERPL-SCNC: 139 MMOL/L (ref 136–145)
TRIGL SERPL-MCNC: 53 MG/DL (ref 30–150)
TSH SERPL-ACNC: 2.59 UIU/ML (ref 0.4–4)
WBC # BLD AUTO: 4.37 K/UL (ref 3.9–12.7)

## 2025-07-25 PROCEDURE — 82465 ASSAY BLD/SERUM CHOLESTEROL: CPT

## 2025-07-25 PROCEDURE — 85025 COMPLETE CBC W/AUTO DIFF WBC: CPT

## 2025-07-25 PROCEDURE — 83036 HEMOGLOBIN GLYCOSYLATED A1C: CPT

## 2025-07-25 PROCEDURE — 82310 ASSAY OF CALCIUM: CPT

## 2025-07-25 PROCEDURE — 36415 COLL VENOUS BLD VENIPUNCTURE: CPT

## 2025-07-25 PROCEDURE — 84443 ASSAY THYROID STIM HORMONE: CPT

## 2025-07-25 PROCEDURE — 84153 ASSAY OF PSA TOTAL: CPT
